# Patient Record
Sex: FEMALE | Race: BLACK OR AFRICAN AMERICAN | Employment: OTHER | URBAN - METROPOLITAN AREA
[De-identification: names, ages, dates, MRNs, and addresses within clinical notes are randomized per-mention and may not be internally consistent; named-entity substitution may affect disease eponyms.]

---

## 2020-07-26 ENCOUNTER — APPOINTMENT (OUTPATIENT)
Dept: CT IMAGING | Age: 70
DRG: 372 | End: 2020-07-26
Payer: MEDICARE

## 2020-07-26 ENCOUNTER — APPOINTMENT (OUTPATIENT)
Dept: GENERAL RADIOLOGY | Age: 70
DRG: 372 | End: 2020-07-26
Payer: MEDICARE

## 2020-07-26 ENCOUNTER — HOSPITAL ENCOUNTER (INPATIENT)
Age: 70
LOS: 6 days | Discharge: HOME HEALTH | DRG: 372 | End: 2020-08-01
Attending: EMERGENCY MEDICINE | Admitting: HOSPITALIST
Payer: MEDICARE

## 2020-07-26 PROBLEM — R19.7 DIARRHEA OF PRESUMED INFECTIOUS ORIGIN: Status: ACTIVE | Noted: 2020-07-26

## 2020-07-26 PROBLEM — F10.11 HISTORY OF ALCOHOL ABUSE: Status: ACTIVE | Noted: 2020-07-26

## 2020-07-26 PROBLEM — L89.319 PRESSURE INJURY OF SKIN OF RIGHT BUTTOCK: Status: ACTIVE | Noted: 2020-07-26

## 2020-07-26 PROBLEM — R19.7 ACUTE DIARRHEA: Status: ACTIVE | Noted: 2020-07-26

## 2020-07-26 PROBLEM — F03.918 DEMENTIA WITH BEHAVIORAL DISTURBANCE: Status: ACTIVE | Noted: 2020-07-26

## 2020-07-26 PROBLEM — S30.810A EXCORIATION OF BUTTOCK: Status: ACTIVE | Noted: 2020-07-26

## 2020-07-26 PROBLEM — K52.9 ACUTE COLITIS: Status: ACTIVE | Noted: 2020-07-26

## 2020-07-26 PROBLEM — E87.6 HYPOKALEMIA: Status: ACTIVE | Noted: 2020-07-26

## 2020-07-26 LAB
A/G RATIO: 0.7 (ref 1.1–2.2)
ALBUMIN SERPL-MCNC: 2.7 G/DL (ref 3.4–5)
ALP BLD-CCNC: 101 U/L (ref 40–129)
ALT SERPL-CCNC: 19 U/L (ref 10–40)
ANION GAP SERPL CALCULATED.3IONS-SCNC: 13 MMOL/L (ref 3–16)
AST SERPL-CCNC: 33 U/L (ref 15–37)
BANDED NEUTROPHILS RELATIVE PERCENT: 2 % (ref 0–7)
BASOPHILS ABSOLUTE: 0 K/UL (ref 0–0.2)
BASOPHILS RELATIVE PERCENT: 0 %
BILIRUB SERPL-MCNC: 0.3 MG/DL (ref 0–1)
BILIRUBIN URINE: ABNORMAL
BLOOD, URINE: NEGATIVE
BUN BLDV-MCNC: 12 MG/DL (ref 7–20)
CALCIUM SERPL-MCNC: 9.6 MG/DL (ref 8.3–10.6)
CHLORIDE BLD-SCNC: 106 MMOL/L (ref 99–110)
CLARITY: CLEAR
CO2: 19 MMOL/L (ref 21–32)
COLOR: ABNORMAL
CREAT SERPL-MCNC: 0.6 MG/DL (ref 0.6–1.2)
EOSINOPHILS ABSOLUTE: 0 K/UL (ref 0–0.6)
EOSINOPHILS RELATIVE PERCENT: 0 %
GFR AFRICAN AMERICAN: >60
GFR NON-AFRICAN AMERICAN: >60
GLOBULIN: 4.1 G/DL
GLUCOSE BLD-MCNC: 90 MG/DL (ref 70–99)
GLUCOSE URINE: NEGATIVE MG/DL
HCT VFR BLD CALC: 41.8 % (ref 36–48)
HEMOGLOBIN: 13.7 G/DL (ref 12–16)
KETONES, URINE: 15 MG/DL
LACTIC ACID, SEPSIS: 1.7 MMOL/L (ref 0.4–1.9)
LEUKOCYTE ESTERASE, URINE: NEGATIVE
LIPASE: 15 U/L (ref 13–60)
LYMPHOCYTES ABSOLUTE: 0.5 K/UL (ref 1–5.1)
LYMPHOCYTES RELATIVE PERCENT: 4 %
MAGNESIUM: 2.3 MG/DL (ref 1.8–2.4)
MCH RBC QN AUTO: 31.9 PG (ref 26–34)
MCHC RBC AUTO-ENTMCNC: 32.9 G/DL (ref 31–36)
MCV RBC AUTO: 96.9 FL (ref 80–100)
MICROSCOPIC EXAMINATION: ABNORMAL
MONOCYTES ABSOLUTE: 1.3 K/UL (ref 0–1.3)
MONOCYTES RELATIVE PERCENT: 11 %
NEUTROPHILS ABSOLUTE: 10.1 K/UL (ref 1.7–7.7)
NEUTROPHILS RELATIVE PERCENT: 83 %
NITRITE, URINE: NEGATIVE
PDW BLD-RTO: 12.7 % (ref 12.4–15.4)
PH UA: 6 (ref 5–8)
PLATELET # BLD: 313 K/UL (ref 135–450)
PLATELET SLIDE REVIEW: ADEQUATE
PMV BLD AUTO: 8.3 FL (ref 5–10.5)
POTASSIUM REFLEX MAGNESIUM: 3 MMOL/L (ref 3.5–5.1)
PROTEIN UA: NEGATIVE MG/DL
RBC # BLD: 4.31 M/UL (ref 4–5.2)
RBC # BLD: NORMAL 10*6/UL
SLIDE REVIEW: ABNORMAL
SODIUM BLD-SCNC: 138 MMOL/L (ref 136–145)
SPECIFIC GRAVITY UA: 1.02 (ref 1–1.03)
TOTAL PROTEIN: 6.8 G/DL (ref 6.4–8.2)
TOXIC GRANULATION: PRESENT
URINE REFLEX TO CULTURE: ABNORMAL
URINE TYPE: ABNORMAL
UROBILINOGEN, URINE: 1 E.U./DL
WBC # BLD: 11.9 K/UL (ref 4–11)

## 2020-07-26 PROCEDURE — 2580000003 HC RX 258: Performed by: NURSE PRACTITIONER

## 2020-07-26 PROCEDURE — 96365 THER/PROPH/DIAG IV INF INIT: CPT

## 2020-07-26 PROCEDURE — 85025 COMPLETE CBC W/AUTO DIFF WBC: CPT

## 2020-07-26 PROCEDURE — 6370000000 HC RX 637 (ALT 250 FOR IP): Performed by: HOSPITALIST

## 2020-07-26 PROCEDURE — 84443 ASSAY THYROID STIM HORMONE: CPT

## 2020-07-26 PROCEDURE — 83735 ASSAY OF MAGNESIUM: CPT

## 2020-07-26 PROCEDURE — 83605 ASSAY OF LACTIC ACID: CPT

## 2020-07-26 PROCEDURE — 81003 URINALYSIS AUTO W/O SCOPE: CPT

## 2020-07-26 PROCEDURE — 2500000003 HC RX 250 WO HCPCS: Performed by: NURSE PRACTITIONER

## 2020-07-26 PROCEDURE — 74177 CT ABD & PELVIS W/CONTRAST: CPT

## 2020-07-26 PROCEDURE — 87040 BLOOD CULTURE FOR BACTERIA: CPT

## 2020-07-26 PROCEDURE — 99285 EMERGENCY DEPT VISIT HI MDM: CPT

## 2020-07-26 PROCEDURE — 83690 ASSAY OF LIPASE: CPT

## 2020-07-26 PROCEDURE — 6370000000 HC RX 637 (ALT 250 FOR IP): Performed by: NURSE PRACTITIONER

## 2020-07-26 PROCEDURE — 80053 COMPREHEN METABOLIC PANEL: CPT

## 2020-07-26 PROCEDURE — 71045 X-RAY EXAM CHEST 1 VIEW: CPT

## 2020-07-26 PROCEDURE — U0003 INFECTIOUS AGENT DETECTION BY NUCLEIC ACID (DNA OR RNA); SEVERE ACUTE RESPIRATORY SYNDROME CORONAVIRUS 2 (SARS-COV-2) (CORONAVIRUS DISEASE [COVID-19]), AMPLIFIED PROBE TECHNIQUE, MAKING USE OF HIGH THROUGHPUT TECHNOLOGIES AS DESCRIBED BY CMS-2020-01-R: HCPCS

## 2020-07-26 PROCEDURE — C9113 INJ PANTOPRAZOLE SODIUM, VIA: HCPCS | Performed by: HOSPITALIST

## 2020-07-26 PROCEDURE — 1200000000 HC SEMI PRIVATE

## 2020-07-26 PROCEDURE — 6360000004 HC RX CONTRAST MEDICATION: Performed by: NURSE PRACTITIONER

## 2020-07-26 PROCEDURE — 6360000002 HC RX W HCPCS: Performed by: HOSPITALIST

## 2020-07-26 PROCEDURE — 6360000002 HC RX W HCPCS: Performed by: NURSE PRACTITIONER

## 2020-07-26 RX ORDER — MAGNESIUM SULFATE 1 G/100ML
1 INJECTION INTRAVENOUS PRN
Status: DISCONTINUED | OUTPATIENT
Start: 2020-07-26 | End: 2020-08-01 | Stop reason: HOSPADM

## 2020-07-26 RX ORDER — ESCITALOPRAM OXALATE 10 MG/1
10 TABLET ORAL DAILY
Status: DISCONTINUED | OUTPATIENT
Start: 2020-07-27 | End: 2020-07-26 | Stop reason: ALTCHOICE

## 2020-07-26 RX ORDER — NICOTINE 21 MG/24HR
1 PATCH, TRANSDERMAL 24 HOURS TRANSDERMAL EVERY 24 HOURS
Status: DISCONTINUED | OUTPATIENT
Start: 2020-07-26 | End: 2020-08-01 | Stop reason: HOSPADM

## 2020-07-26 RX ORDER — ONDANSETRON 2 MG/ML
4 INJECTION INTRAMUSCULAR; INTRAVENOUS EVERY 6 HOURS PRN
Status: DISCONTINUED | OUTPATIENT
Start: 2020-07-26 | End: 2020-08-01 | Stop reason: HOSPADM

## 2020-07-26 RX ORDER — SODIUM PHOSPHATE, DIBASIC AND SODIUM PHOSPHATE, MONOBASIC 7; 19 G/133ML; G/133ML
1 ENEMA RECTAL PRN
Status: DISCONTINUED | OUTPATIENT
Start: 2020-07-26 | End: 2020-07-27

## 2020-07-26 RX ORDER — ACETAMINOPHEN 325 MG/1
650 TABLET ORAL EVERY 6 HOURS PRN
Status: DISCONTINUED | OUTPATIENT
Start: 2020-07-26 | End: 2020-08-01 | Stop reason: HOSPADM

## 2020-07-26 RX ORDER — CIPROFLOXACIN 2 MG/ML
400 INJECTION, SOLUTION INTRAVENOUS ONCE
Status: COMPLETED | OUTPATIENT
Start: 2020-07-26 | End: 2020-07-26

## 2020-07-26 RX ORDER — LANOLIN ALCOHOL/MO/W.PET/CERES
1000 CREAM (GRAM) TOPICAL DAILY
Status: DISCONTINUED | OUTPATIENT
Start: 2020-07-27 | End: 2020-08-01 | Stop reason: HOSPADM

## 2020-07-26 RX ORDER — ACETAMINOPHEN 650 MG/1
650 SUPPOSITORY RECTAL EVERY 6 HOURS PRN
Status: DISCONTINUED | OUTPATIENT
Start: 2020-07-26 | End: 2020-08-01 | Stop reason: HOSPADM

## 2020-07-26 RX ORDER — SODIUM CHLORIDE 0.9 % (FLUSH) 0.9 %
10 SYRINGE (ML) INJECTION PRN
Status: DISCONTINUED | OUTPATIENT
Start: 2020-07-26 | End: 2020-08-01 | Stop reason: HOSPADM

## 2020-07-26 RX ORDER — THIAMINE MONONITRATE (VIT B1) 100 MG
100 TABLET ORAL DAILY
Status: DISCONTINUED | OUTPATIENT
Start: 2020-07-27 | End: 2020-08-01 | Stop reason: HOSPADM

## 2020-07-26 RX ORDER — DONEPEZIL HYDROCHLORIDE 5 MG/1
5 TABLET, FILM COATED ORAL NIGHTLY
Status: DISCONTINUED | OUTPATIENT
Start: 2020-07-26 | End: 2020-08-01 | Stop reason: HOSPADM

## 2020-07-26 RX ORDER — POTASSIUM CHLORIDE 20 MEQ/1
40 TABLET, EXTENDED RELEASE ORAL PRN
Status: DISCONTINUED | OUTPATIENT
Start: 2020-07-26 | End: 2020-08-01 | Stop reason: HOSPADM

## 2020-07-26 RX ORDER — 0.9 % SODIUM CHLORIDE 0.9 %
1000 INTRAVENOUS SOLUTION INTRAVENOUS ONCE
Status: COMPLETED | OUTPATIENT
Start: 2020-07-26 | End: 2020-07-26

## 2020-07-26 RX ORDER — AMLODIPINE BESYLATE 5 MG/1
5 TABLET ORAL DAILY
Status: DISCONTINUED | OUTPATIENT
Start: 2020-07-27 | End: 2020-08-01 | Stop reason: HOSPADM

## 2020-07-26 RX ORDER — ONDANSETRON 4 MG/1
4 TABLET, ORALLY DISINTEGRATING ORAL EVERY 8 HOURS PRN
Status: DISCONTINUED | OUTPATIENT
Start: 2020-07-26 | End: 2020-08-01 | Stop reason: HOSPADM

## 2020-07-26 RX ORDER — THIAMINE MONONITRATE (VIT B1) 100 MG
100 TABLET ORAL DAILY
COMMUNITY

## 2020-07-26 RX ORDER — IPRATROPIUM BROMIDE AND ALBUTEROL SULFATE 2.5; .5 MG/3ML; MG/3ML
1 SOLUTION RESPIRATORY (INHALATION) EVERY 4 HOURS PRN
Status: DISCONTINUED | OUTPATIENT
Start: 2020-07-26 | End: 2020-08-01 | Stop reason: HOSPADM

## 2020-07-26 RX ORDER — TRAMADOL HYDROCHLORIDE 50 MG/1
50 TABLET ORAL ONCE
Status: COMPLETED | OUTPATIENT
Start: 2020-07-26 | End: 2020-07-26

## 2020-07-26 RX ORDER — CIPROFLOXACIN 2 MG/ML
400 INJECTION, SOLUTION INTRAVENOUS EVERY 12 HOURS
Status: DISCONTINUED | OUTPATIENT
Start: 2020-07-27 | End: 2020-08-01

## 2020-07-26 RX ORDER — PANTOPRAZOLE SODIUM 40 MG/10ML
40 INJECTION, POWDER, LYOPHILIZED, FOR SOLUTION INTRAVENOUS DAILY
Status: DISCONTINUED | OUTPATIENT
Start: 2020-07-26 | End: 2020-08-01 | Stop reason: HOSPADM

## 2020-07-26 RX ORDER — FOLIC ACID 1 MG/1
1 TABLET ORAL DAILY
COMMUNITY

## 2020-07-26 RX ORDER — AMLODIPINE BESYLATE 5 MG/1
5 TABLET ORAL DAILY
COMMUNITY

## 2020-07-26 RX ORDER — OYSTER SHELL CALCIUM WITH VITAMIN D 500; 200 MG/1; [IU]/1
1 TABLET, FILM COATED ORAL 2 TIMES DAILY WITH MEALS
Status: DISCONTINUED | OUTPATIENT
Start: 2020-07-27 | End: 2020-07-26 | Stop reason: RX

## 2020-07-26 RX ORDER — POTASSIUM CHLORIDE 7.45 MG/ML
10 INJECTION INTRAVENOUS PRN
Status: DISCONTINUED | OUTPATIENT
Start: 2020-07-26 | End: 2020-08-01 | Stop reason: HOSPADM

## 2020-07-26 RX ORDER — DONEPEZIL HYDROCHLORIDE 5 MG/1
5 TABLET, FILM COATED ORAL NIGHTLY
COMMUNITY

## 2020-07-26 RX ORDER — ESCITALOPRAM OXALATE 10 MG/1
10 TABLET ORAL DAILY
COMMUNITY

## 2020-07-26 RX ORDER — FOLIC ACID 1 MG/1
1 TABLET ORAL DAILY
Status: DISCONTINUED | OUTPATIENT
Start: 2020-07-27 | End: 2020-08-01 | Stop reason: HOSPADM

## 2020-07-26 RX ORDER — OYSTER SHELL CALCIUM WITH VITAMIN D 500; 200 MG/1; [IU]/1
1 TABLET, FILM COATED ORAL 2 TIMES DAILY
COMMUNITY

## 2020-07-26 RX ORDER — ANASTROZOLE 1 MG/1
1 TABLET ORAL DAILY
COMMUNITY

## 2020-07-26 RX ADMIN — TRAMADOL HYDROCHLORIDE 50 MG: 50 TABLET, FILM COATED ORAL at 16:05

## 2020-07-26 RX ADMIN — POTASSIUM CHLORIDE 10 MEQ: 7.46 INJECTION, SOLUTION INTRAVENOUS at 21:11

## 2020-07-26 RX ADMIN — PANTOPRAZOLE SODIUM 40 MG: 40 INJECTION, POWDER, FOR SOLUTION INTRAVENOUS at 21:10

## 2020-07-26 RX ADMIN — POTASSIUM CHLORIDE 10 MEQ: 7.46 INJECTION, SOLUTION INTRAVENOUS at 23:31

## 2020-07-26 RX ADMIN — METRONIDAZOLE 500 MG: 500 INJECTION, SOLUTION INTRAVENOUS at 18:09

## 2020-07-26 RX ADMIN — SODIUM CHLORIDE 1000 ML: 9 INJECTION, SOLUTION INTRAVENOUS at 13:43

## 2020-07-26 RX ADMIN — IOPAMIDOL 75 ML: 755 INJECTION, SOLUTION INTRAVENOUS at 14:59

## 2020-07-26 RX ADMIN — POTASSIUM CHLORIDE 10 MEQ: 7.46 INJECTION, SOLUTION INTRAVENOUS at 22:25

## 2020-07-26 RX ADMIN — CIPROFLOXACIN 400 MG: 2 INJECTION, SOLUTION INTRAVENOUS at 16:10

## 2020-07-26 ASSESSMENT — PAIN SCALES - GENERAL
PAINLEVEL_OUTOF10: 0
PAINLEVEL_OUTOF10: 3
PAINLEVEL_OUTOF10: 5

## 2020-07-26 ASSESSMENT — ENCOUNTER SYMPTOMS: DIARRHEA: 1

## 2020-07-26 NOTE — ED PROVIDER NOTES
[facility]        Pt Name: Maximo Brody  MRN: [id number]  [place] 1950  Date of evaluation: 7/26/2020  Provider: RUI Chamorro - GRISELDA  PCP: Dain Guadalupe MD    This patient was not seen and evaluated by the attending physician Perri Badillo MD.    [address] [place] [address]       Chief Complaint   Patient presents with    Diarrhea     pt brought in by private ems from [facility], c/o loose stool x 4 days        HISTORY OF PRESENT ILLNESS   (Location/Symptom, Timing/Onset,Context/Setting, Quality, Duration, Modifying Factors, Severity)  Note limiting factors. Maximo Brody is a 81 y.o. female who presents emergency department with concern for diarrhea. According to the facility where she lives she is had 4 days of loose stools. She has pretty bad ulceration to her buttocks as a result. She is currently living in a dementia unit and is typically alert to person but not place or time. She is verbally aggressive on arrival and there are no care providers or family available for further history. Nursing Notes triage note reviewed and agreed with or any disagreements were addressed  in the HPI. REVIEW OF SYSTEMS    (2-9 systems for level 4, 10 or more for level 5)     Review of Systems   Gastrointestinal: Positive for diarrhea. Skin: Positive for wound. Positives and Pertinent negatives as per HPI. Except as noted above in the ROS, all other systems were reviewed and negative.        PAST MEDICAL HISTORY     Past Medical History:   Diagnosis Date    Cancer [facility])     cancer    Hypertension     Pneumonia     Sickle cell trait ([facility] [address]          SURGICAL HISTORY       Past Surgical History:   Procedure Laterality Date    HEMORRHOID SURGERY      TUBAL LIGATION           CURRENT MEDICATIONS       Previous Medications    AMLODIPINE (NORVASC) 5 MG TABLET    Take 5 mg by mouth daily    ANASTROZOLE (ARIMIDEX) 1 MG organizations: Not on file     Relationship status: Not on file    Intimate partner violence     Fear of current or ex partner: Not on file     Emotionally abused: Not on file     Physically abused: Not on file     Forced sexual activity: Not on file   Other Topics Concern    Not on file   Social History Narrative    Not on file       SCREENINGS             PHYSICAL EXAM  (up to 7 for level 4, 8 or more for level 5)     ED Triage Vitals [07/26/20 1131]   BP Temp Temp Source Pulse Resp SpO2 Height Weight   103/68 98 °F (36.7 °C) Oral 70 14 100 % 5' 6\" (1.676 m) 135 lb (61.2 kg)       Physical Exam  Vitals signs and nursing note reviewed. Exam conducted with a chaperone present. Constitutional:       Appearance: She is well-developed and underweight. She is not diaphoretic. HENT:      Head: Normocephalic and atraumatic. Eyes:      General: No scleral icterus. Right eye: No discharge. Left eye: No discharge. Neck:      Musculoskeletal: Normal range of motion and neck supple. Cardiovascular:      Rate and Rhythm: Normal rate and regular rhythm. Heart sounds: Normal heart sounds. No murmur. No friction rub. No gallop. Pulmonary:      Effort: Pulmonary effort is normal. No respiratory distress. Breath sounds: Normal breath sounds. No stridor. No wheezing or rales. Chest:      Chest wall: No tenderness. Abdominal:      General: Bowel sounds are normal. There is no distension. Palpations: Abdomen is soft. There is no mass. Tenderness: There is no abdominal tenderness. There is no guarding or rebound. Genitourinary:     Rectum: External hemorrhoid present. No mass, tenderness, anal fissure or internal hemorrhoid. Normal anal tone. Comments: Satish Ellis RN present during rectal exam.  Patient tolerated very poorly secondary to pain and dementia. There is stool in the rectal vault but no obvious stool ball. Musculoskeletal: Normal range of motion. Performed at:  [facility] [address] [facility], [address]   Phone [phone]   LACTATE, SEPSIS   COVID-19       All other labs werewithin normal range or not returned as of this dictation. EKG: All EKG's are interpreted by the Emergency Department Physician who either signs or Co-signs this chart in the absence of acardiologist.  Please see their note for interpretation of EKG. RADIOLOGY:   Interpretation per the Radiologist below, if available at the time of this note:    CT ABDOMEN PELVIS W IV CONTRAST Additional Contrast? None   Preliminary Result   1. Colonic wall thickening with perienteric inflammatory changes in the   distal sigmoid colon and rectum concerning for stercoral colitis. Mild   retained stool. 2. No other acute findings within the abdomen or pelvis. 3. Degenerated calcified uterine fibroids. 4. Moderate distention of the urinary bladder. No results found. PROCEDURES   Unless otherwise noted below, none     Procedures    CRITICAL CARE TIME     There was a high probability of life-threatening clinical deterioration in the patient's condition requiring my urgent intervention. The total critical care time spent while evaluating and treating this patient was at least 0 minutes. This excludes time spent doing separately billable procedures. This includes time at the bedside, data interpretation, medication management, obtaining critical history from collateral sources if the patient is unable to provide it directly, and physician consultation. Specifics of interventions taken and potentially life-threatening diagnostic considerations are listed in the medical decision making.       CONSULTS:  IP CONSULT TO HOSPITALIST  IP CONSULT TO GI      EMERGENCY DEPARTMENT COURSE and DIFFERENTIAL DIAGNOSIS/MDM:   Vitals:    Vitals:    07/26/20 1131 07/26/20 1145 07/26/20 1215 07/26/20 1445   BP: 103/68 115/64 113/66 123/65   Pulse: 70 69 70

## 2020-07-26 NOTE — ED PROVIDER NOTES
[facility] Emergency Department      Pt Name: Tacho Jacobs  MRN: [id number]  [place] 1950  Date of evaluation: 7/26/2020  Provider: Feliciano Dela Cruz MD  I independently performed a history and physical on Tacho Jacobs. All diagnostic, treatment, and disposition decisions were made by myself in conjunction with the advanced practice provider. HPI: Tacho Jacobs presented with   Chief Complaint   Patient presents with    Diarrhea     pt brought in by private ems from [facility], c/o loose stool x 4 days      Tacho Jacobs has a past medical history of Cancer ([facility] [address] Hypertension, Pneumonia, and Sickle cell trait ([facility] [address] She has a past surgical history that includes Hemorrhoid surgery and Tubal ligation. No current facility-administered medications on file prior to encounter. Current Outpatient Medications on File Prior to Encounter   Medication Sig Dispense Refill    hydrochlorothiazide (HYDRODIURIL) 25 MG tablet Take 25 mg by mouth daily      ibuprofen (ADVIL;MOTRIN) 800 MG tablet Take 800 mg by mouth daily      HYDROcodone-acetaminophen (NORCO) 5-325 MG per tablet Take 1 tablet by mouth every 6 hours as needed for Pain (Takes it once or twice a day)       PHYSICAL EXAM  Vitals: /68   Pulse 70   Temp 98 °F (36.7 °C) (Oral)   Resp 14   Ht 5' 6\" (1.676 m)   Wt 135 lb (61.2 kg)   SpO2 100%   BMI 21.79 kg/m²   Constitutional:  81 y.o. female alert but very poor historian, hx dementia  HENT:  Atraumatic, oral mucosa moist  Neck:  No visible JVD, supple  Chest/Lungs:  Respiratory effort normal, clear, regular  Abdomen:  Non-distended, soft, NT  Back:  No gross deformity  Extremities:  Normal tone and perfusion    Medical Decision Making and Plan: Briefly, this is an 81 y. o.female who presented with concern for diarrhea, also skin ulceration. Findings of colitis present. Plan is to admit for further care.      For further details of [address] Emergency Department encounter, CHEST PORTABLE   Final Result   Possible acute infiltrate such as pneumonia medial and central lower left   lung. Pulmonary sequela typical of that seen with smoking, including COPD. Calcific atherosclerotic disease aorta. CT ABDOMEN PELVIS W IV CONTRAST Additional Contrast? None   Preliminary Result   1. Colonic wall thickening with perienteric inflammatory changes in the   distal sigmoid colon and rectum concerning for stercoral colitis. Mild   retained stool. 2. No other acute findings within the abdomen or pelvis. 3. Degenerated calcified uterine fibroids. 4. Moderate distention of the urinary bladder.            Medications administered:  Medications   vitamin B-12 (CYANOCOBALAMIN) tablet 1,000 mcg (has no administration in time range)   donepezil (ARICEPT) tablet 5 mg (5 mg Oral Not Given 9/41/66 1944)   folic acid (FOLVITE) tablet 1 mg (has no administration in time range)   vitamin B-1 (THIAMINE) tablet 100 mg (has no administration in time range)   amLODIPine (NORVASC) tablet 5 mg (has no administration in time range)   sodium chloride flush 0.9 % injection 10 mL (has no administration in time range)   potassium chloride (KLOR-CON M) extended release tablet 40 mEq ( Oral See Alternative 7/26/20 2331)     Or   potassium bicarb-citric acid (EFFER-K) effervescent tablet 40 mEq ( Oral See Alternative 7/26/20 2331)     Or   potassium chloride 10 mEq/100 mL IVPB (Peripheral Line) (10 mEq Intravenous New Bag 7/26/20 2331)   magnesium sulfate 1 g in dextrose 5% 100 mL IVPB (has no administration in time range)   acetaminophen (TYLENOL) tablet 650 mg (has no administration in time range)     Or   acetaminophen (TYLENOL) suppository 650 mg (has no administration in time range)   ipratropium-albuterol (DUONEB) nebulizer solution 1 ampule (has no administration in time range)   ondansetron (ZOFRAN-ODT) disintegrating tablet 4 mg (has no administration in time range)     Or   ondansetron (ZOFRAN) injection 4 mg (has no administration in time range)   pantoprazole (PROTONIX) injection 40 mg (40 mg Intravenous Given 7/26/20 2110)   nicotine (NICODERM CQ) 21 MG/24HR 1 patch (1 patch Transdermal Patch Applied 7/26/20 2111)   enoxaparin (LOVENOX) injection 40 mg (has no administration in time range)   metronidazole (FLAGYL) 500 mg in NaCl 100 mL IVPB premix (has no administration in time range)   ciprofloxacin (CIPRO) IVPB 400 mg (has no administration in time range)   fleet rectal enema 1 enema (has no administration in time range)   polyethylene glycol (GLYCOLAX) packet 17 g (has no administration in time range)   mineral oil enema 1 enema (has no administration in time range)   0.9 % sodium chloride bolus (0 mLs Intravenous Stopped 7/26/20 1507)   iopamidol (ISOVUE-370) 76 % injection 75 mL (75 mLs Intravenous Given 7/26/20 1459)   traMADol (ULTRAM) tablet 50 mg (50 mg Oral Given 7/26/20 1605)   ciprofloxacin (CIPRO) IVPB 400 mg (0 mg Intravenous Stopped 7/26/20 1814)   metronidazole (FLAGYL) 500 mg in NaCl 100 mL IVPB premix (0 mg Intravenous Stopped 7/26/20 1910)     FINAL IMPRESSION:    1.  Colitis       DISPOSITION Admitted 07/26/2020 05:22:25 PM       Nena Cantor MD  07/27/20 0030

## 2020-07-26 NOTE — ED NOTES
Changed pt, pt continues with scant loose stool. Provided nicki-care. Pt daughter to room to see pt. Placed Mepilex to coccyx./  Pt is on a continuous pulse oximetry and telemetry monitoring. Pt continued on cycling blood pressure. Fall risk precautions in place, call light in reach, bed side table within reach, bed alarm on, will continue to monitor.          Mia Lamb RN  07/26/20 0155

## 2020-07-26 NOTE — ED NOTES
Pt seen on tele on 5T HR 62, Unit secretary.    Pt transport by hospital transport team.      Odilia Cage RN  07/26/20 0102

## 2020-07-26 NOTE — H&P
HOSPITALISTS HISTORY AND PHYSICAL    7/26/2020 5:55 PM    Patient Information:  Hao Shields is a 81 y.o. female [id number]  PCP:  Yany Ma MD (Tel: [phone] )    Chief complaint:    Chief Complaint   Patient presents with    Diarrhea     pt brought in by private ems from [facility], c/o loose stool x 4 days         History of Present Illness:  Terry Spencer is a 81 y.o. female who presented to the ED from [facility] memory unit to be evaluated for 4-day history of diarrhea. The patient has a history of dementia with behavioral disturbance, and she has been verbally aggressive since arrival to the hospital.  As a result of this, the ED history is limited. Labs were obtained in addition to CT scan of the abdomen pelvis. The latter study showed findings concerning for acute colitis possibly stercoral in nature. During attempt to manually disimpact the patient in the ED, it was noted that she had a small decubitus excoriation on her right buttock. She also did not tolerate attempts to remove the formed stool bolus in her rectum. Hospitalist consulted to admit this patient and provide care for a forementioned comorbidities    History obtained from patient and epic chart  Old medical records show that the patient has a history of alcohol abuse. Her daughter Priscilla Sapp has received guardianship of the patient per telephone encounter with  documented on July 13, 2020. She also has a history of invasive lobar carcinoma of the breast for which she is on an aromatase inhibitor. REVIEW OF SYSTEMS: Results based on objective findings due to patient's refusal to answer questions  Constitutional: Negative for fever,chills or night sweats  ENT: Negative for rhinorrhea, epistaxis, hoarseness, sore throat.   Respiratory: Negative for shortness of breath,wheezing  Cardiovascular: Negative for chest pain, palpitations   Gastrointestinal: Positive foul-smelling diarrhea with incontinence  Genitourinary: Negative for polyuria, dysuria   Hematologic/Lymphatic: Negative for bleeding tendency, easy bruising  Musculoskeletal: Positive for myalgias and arthralgias  Neurologic: Negative for confusion,dysarthria. Skin: Right buttock pressure ulcer  Psychiatric: Dementia with agitation  Endocrine: Negative for polydipsia,polyuria,heat /cold intolerance. Past Medical History:   has a past medical history of Cancer ([facility] [address] Hypertension, Pneumonia, and Sickle cell trait ([facility] [address] Past Surgical History:   has a past surgical history that includes Hemorrhoid surgery and Tubal ligation. Medications:  No current facility-administered medications on file prior to encounter. Current Outpatient Medications on File Prior to Encounter   Medication Sig Dispense Refill    amLODIPine (NORVASC) 5 MG tablet Take 5 mg by mouth daily      anastrozole (ARIMIDEX) 1 MG tablet Take 1 mg by mouth daily      calcium-vitamin D (OSCAL-500) 500-200 MG-UNIT per tablet Take 1 tablet by mouth 2 times daily      cyanocobalamin 1000 MCG tablet Take 1,000 mcg by mouth daily      donepezil (ARICEPT) 5 MG tablet Take 5 mg by mouth nightly      escitalopram (LEXAPRO) 10 MG tablet Take 10 mg by mouth daily      folic acid (FOLVITE) 1 MG tablet Take 1 mg by mouth daily      vitamin B-1 (THIAMINE) 100 MG tablet Take 100 mg by mouth daily      hydrochlorothiazide (HYDRODIURIL) 25 MG tablet Take 25 mg by mouth daily      ibuprofen (ADVIL;MOTRIN) 800 MG tablet Take 800 mg by mouth daily      HYDROcodone-acetaminophen (NORCO) 5-325 MG per tablet Take 1 tablet by mouth every 6 hours as needed for Pain (Takes it once or twice a day)         Allergies:  No Known Allergies     Social History:  Patient Lives at [facility] memory loss [facility]. reports that she has been smoking cigarettes.  She has been smoking about 1.00 pack per day. She does not have any smokeless tobacco history on file. She reports current alcohol use. She reports that she does not use drugs. Family History:  family history is not on file. Physical Exam:  /65   Pulse 70   Temp 98 °F (36.7 °C) (Oral)   Resp 14   Ht 5' 6\" (1.676 m)   Wt 135 lb (61.2 kg)   SpO2 96%   BMI 21.79 kg/m²     General appearance: Agitated female who appears distressed  Eyes: Sclera clear, pupils equal  ENT: Moist mucus membranes, no thrush. Trachea midline. Cardiovascular: Regular rhythm, normal S1, S2. No murmur, gallop, rub. No edema in lower extremities  Respiratory: Decreased breath sounds with poor inspiratory effort  Gastrointestinal: Abdomen tender with hypoactive bowel sounds; positive guarding  Musculoskeletal: No cyanosis in digits, neck supple  Neurology: Cranial nerves grossly intact. Alert with very limited short-term memory  Psychiatry: Agitated and verbally aggressive with staff  Skin: Right buttock with coin sized excoriation/stage II decubitus  Brisk capillary refill, peripheral pulses palpable   Labs:  CBC:   Lab Results   Component Value Date    WBC 11.9 07/26/2020    RBC 4.31 07/26/2020    HGB 13.7 07/26/2020    HCT 41.8 07/26/2020    MCV 96.9 07/26/2020    MCH 31.9 07/26/2020    MCHC 32.9 07/26/2020    RDW 12.7 07/26/2020     07/26/2020    MPV 8.3 07/26/2020     BMP:    Lab Results   Component Value Date     07/26/2020    K 3.0 07/26/2020     07/26/2020    CO2 19 07/26/2020    BUN 12 07/26/2020    CREATININE 0.6 07/26/2020    CALCIUM 9.6 07/26/2020    GFRAA >60 07/26/2020    LABGLOM >60 07/26/2020    GLUCOSE 90 07/26/2020     CT ABDOMEN PELVIS W IV CONTRAST Additional Contrast? None   Preliminary Result   1. Colonic wall thickening with perienteric inflammatory changes in the   distal sigmoid colon and rectum concerning for stercoral colitis. Mild   retained stool. 2. No other acute findings within the abdomen or pelvis. 3. Degenerated calcified uterine fibroids. 4. Moderate distention of the urinary bladder. CXR and EKG ordered by hospitalist team; results not yet available    Discussed case  with ED provider  Problem List  Principal Problem:    Acute colitis  Active Problems:    Hypokalemia    Excoriation of buttock    Acute diarrhea    Dementia with behavioral disturbance ([address]  Resolved Problems:    * No resolved hospital problems. *        Assessment/Plan:     1. Acute colitis (infectious versus stercoral)  -Patient admitted to remote telemetry unit for close observation  - Blood culture collected and patient initiated on IV Cipro and Flagyl  - Initiate mineral oil enemas as tolerated to remove stool burden   -GI consultation placed for possible manual disimpaction if other efforts are unsuccessful  -Clear liquid diet today and n.p.o. after midnight  - Stool sent for C. difficile toxin as well as fecal leukocytes and culture    2. Incontinence with right buttock decubitus  -Ramires placed for this as well as urinary retention  - Wound care consulted    3. Dementia with behavioral disturbance/history of alcoholism  - Alcohol intake unclear at this time therefore CIWA protocol in place  - Fall risk and seizure precautions taken  - Thiamine folate and MVI initiated  -PRN Ativan per CIWA score  -Nutrition consult secondary to hypoalbuminemia; prealbumin pending    4. COPD with tobacco abuse  - Nicotine replacement patch ordered  -DuoNebs as needed  -Continuous pulse oximetry with PRN supplemental O2      DVT prophylaxis-Lovenox 40 mg subcu daily  Code status-full code  Diet- clear liquid diet and n.p.o. at midnight  IV access-peripheral established in ED  Ramires Catheter-placed in ED due to incontinence and decubitus    Admit as inpatient. I anticipate hospitalization spanning more than two midnights for investigation and treatment of the above medically necessary diagnoses.     Please note that some part of this chart was generated using Dragon dictation software. Although every effort was made to ensure the accuracy of this automated transcription, some errors in transcription may have occurred inadvertently. If you may need any clarification, please do not hesitate to contact me through [facility].        Chitra Acosta MD    7/26/2020 5:55 PM

## 2020-07-26 NOTE — ED NOTES
Pt incontinent of urine, pt provided per Jorge A hoang CNP at bedside at assist and perform rectal exam. Pt cooperative. Ok per [place] CNP for pt to eat.       [facility]  07/26/20 0014

## 2020-07-26 NOTE — ED NOTES
Upon arrival pt is cussing at staff. Pt is combative with care. Pt is a/o to self only. Pt noted with large excoriation around her anus, bright red and open. Pt is straight cathed for urine. Pt incontinent of stool,  Provided nicki care, place zinc cream to buttock. Pt complaints of pain to buttock site, Pt cussing at staff with care. Pt refusing to keep on bp on and takes off cardiac leads. Call light in place and will continue to monitor.       Negra Conley RN  07/26/20 1676

## 2020-07-26 NOTE — ED NOTES
Bed: 06  Expected date:   Expected time:   Means of arrival:   Comments:  [facility] transport     Marian Ness RN  07/26/20 5794

## 2020-07-26 NOTE — PROGRESS NOTES
Pt admitted to room 5572, VSS, afebrile, pt currently denies pain but states her nicki-area is uncomfortable. Pt is alert but confused, is calm and cooperative. POC discussed with pt and dtr Willie Sender who had some questions/concerns which were addressed at this time. Fall px in placed, bed alarm engaged, call light within reach, will continue to monitor.

## 2020-07-27 LAB
A/G RATIO: 0.8 (ref 1.1–2.2)
ALBUMIN SERPL-MCNC: 2.5 G/DL (ref 3.4–5)
ALP BLD-CCNC: 91 U/L (ref 40–129)
ALT SERPL-CCNC: 14 U/L (ref 10–40)
ANION GAP SERPL CALCULATED.3IONS-SCNC: 11 MMOL/L (ref 3–16)
AST SERPL-CCNC: 25 U/L (ref 15–37)
BASOPHILS ABSOLUTE: 0.1 K/UL (ref 0–0.2)
BASOPHILS RELATIVE PERCENT: 0.9 %
BILIRUB SERPL-MCNC: <0.2 MG/DL (ref 0–1)
BUN BLDV-MCNC: 9 MG/DL (ref 7–20)
CALCIUM SERPL-MCNC: 8.9 MG/DL (ref 8.3–10.6)
CHLORIDE BLD-SCNC: 109 MMOL/L (ref 99–110)
CO2: 20 MMOL/L (ref 21–32)
CREAT SERPL-MCNC: 0.5 MG/DL (ref 0.6–1.2)
EOSINOPHILS ABSOLUTE: 0 K/UL (ref 0–0.6)
EOSINOPHILS RELATIVE PERCENT: 0.5 %
GFR AFRICAN AMERICAN: >60
GFR NON-AFRICAN AMERICAN: >60
GLOBULIN: 3.2 G/DL
GLUCOSE BLD-MCNC: 83 MG/DL (ref 70–99)
HCT VFR BLD CALC: 35.2 % (ref 36–48)
HEMOGLOBIN: 11.8 G/DL (ref 12–16)
LACTIC ACID: 0.8 MMOL/L (ref 0.4–2)
LYMPHOCYTES ABSOLUTE: 1.1 K/UL (ref 1–5.1)
LYMPHOCYTES RELATIVE PERCENT: 13 %
MCH RBC QN AUTO: 32.7 PG (ref 26–34)
MCHC RBC AUTO-ENTMCNC: 33.5 G/DL (ref 31–36)
MCV RBC AUTO: 97.6 FL (ref 80–100)
MONOCYTES ABSOLUTE: 1.4 K/UL (ref 0–1.3)
MONOCYTES RELATIVE PERCENT: 16 %
NEUTROPHILS ABSOLUTE: 6.1 K/UL (ref 1.7–7.7)
NEUTROPHILS RELATIVE PERCENT: 69.6 %
PDW BLD-RTO: 12.7 % (ref 12.4–15.4)
PLATELET # BLD: 291 K/UL (ref 135–450)
PMV BLD AUTO: 8.3 FL (ref 5–10.5)
POTASSIUM REFLEX MAGNESIUM: 3.6 MMOL/L (ref 3.5–5.1)
RBC # BLD: 3.61 M/UL (ref 4–5.2)
SARS-COV-2, PCR: NOT DETECTED
SODIUM BLD-SCNC: 140 MMOL/L (ref 136–145)
TOTAL PROTEIN: 5.7 G/DL (ref 6.4–8.2)
TSH REFLEX: 0.93 UIU/ML (ref 0.27–4.2)
WBC # BLD: 8.7 K/UL (ref 4–11)

## 2020-07-27 PROCEDURE — 6370000000 HC RX 637 (ALT 250 FOR IP): Performed by: HOSPITALIST

## 2020-07-27 PROCEDURE — 85025 COMPLETE CBC W/AUTO DIFF WBC: CPT

## 2020-07-27 PROCEDURE — 51702 INSERT TEMP BLADDER CATH: CPT

## 2020-07-27 PROCEDURE — 80053 COMPREHEN METABOLIC PANEL: CPT

## 2020-07-27 PROCEDURE — 6360000002 HC RX W HCPCS: Performed by: HOSPITALIST

## 2020-07-27 PROCEDURE — 2580000003 HC RX 258: Performed by: INTERNAL MEDICINE

## 2020-07-27 PROCEDURE — 2580000003 HC RX 258: Performed by: HOSPITALIST

## 2020-07-27 PROCEDURE — 83605 ASSAY OF LACTIC ACID: CPT

## 2020-07-27 PROCEDURE — 94761 N-INVAS EAR/PLS OXIMETRY MLT: CPT

## 2020-07-27 PROCEDURE — C9113 INJ PANTOPRAZOLE SODIUM, VIA: HCPCS | Performed by: HOSPITALIST

## 2020-07-27 PROCEDURE — 51798 US URINE CAPACITY MEASURE: CPT

## 2020-07-27 PROCEDURE — 36415 COLL VENOUS BLD VENIPUNCTURE: CPT

## 2020-07-27 PROCEDURE — 2500000003 HC RX 250 WO HCPCS: Performed by: HOSPITALIST

## 2020-07-27 PROCEDURE — 1200000000 HC SEMI PRIVATE

## 2020-07-27 RX ORDER — POLYETHYLENE GLYCOL 3350 17 G/17G
17 POWDER, FOR SOLUTION ORAL DAILY
Status: DISCONTINUED | OUTPATIENT
Start: 2020-07-27 | End: 2020-08-01 | Stop reason: HOSPADM

## 2020-07-27 RX ORDER — MINERAL OIL 100 G/100G
1 OIL RECTAL PRN
Status: DISCONTINUED | OUTPATIENT
Start: 2020-07-27 | End: 2020-07-27

## 2020-07-27 RX ORDER — LORAZEPAM 2 MG/ML
1 INJECTION INTRAMUSCULAR ONCE
Status: COMPLETED | OUTPATIENT
Start: 2020-07-27 | End: 2020-07-27

## 2020-07-27 RX ORDER — SODIUM CHLORIDE 9 MG/ML
INJECTION, SOLUTION INTRAVENOUS CONTINUOUS
Status: DISCONTINUED | OUTPATIENT
Start: 2020-07-27 | End: 2020-07-28

## 2020-07-27 RX ADMIN — CIPROFLOXACIN 400 MG: 2 INJECTION, SOLUTION INTRAVENOUS at 05:47

## 2020-07-27 RX ADMIN — Medication 10 ML: at 09:44

## 2020-07-27 RX ADMIN — POLYETHYLENE GLYCOL 3350 17 G: 17 POWDER, FOR SOLUTION ORAL at 00:57

## 2020-07-27 RX ADMIN — ENOXAPARIN SODIUM 40 MG: 40 INJECTION SUBCUTANEOUS at 09:44

## 2020-07-27 RX ADMIN — CIPROFLOXACIN 400 MG: 2 INJECTION, SOLUTION INTRAVENOUS at 18:59

## 2020-07-27 RX ADMIN — POTASSIUM CHLORIDE 10 MEQ: 7.46 INJECTION, SOLUTION INTRAVENOUS at 03:31

## 2020-07-27 RX ADMIN — SODIUM CHLORIDE: 9 INJECTION, SOLUTION INTRAVENOUS at 12:18

## 2020-07-27 RX ADMIN — METRONIDAZOLE 500 MG: 500 INJECTION, SOLUTION INTRAVENOUS at 04:40

## 2020-07-27 RX ADMIN — POTASSIUM CHLORIDE 10 MEQ: 7.46 INJECTION, SOLUTION INTRAVENOUS at 00:58

## 2020-07-27 RX ADMIN — ACETAMINOPHEN 650 MG: 325 TABLET, FILM COATED ORAL at 18:59

## 2020-07-27 RX ADMIN — METRONIDAZOLE 500 MG: 500 INJECTION, SOLUTION INTRAVENOUS at 21:43

## 2020-07-27 RX ADMIN — METRONIDAZOLE 500 MG: 500 INJECTION, SOLUTION INTRAVENOUS at 12:19

## 2020-07-27 RX ADMIN — LORAZEPAM 1 MG: 2 INJECTION INTRAMUSCULAR; INTRAVENOUS at 03:04

## 2020-07-27 RX ADMIN — PANTOPRAZOLE SODIUM 40 MG: 40 INJECTION, POWDER, FOR SOLUTION INTRAVENOUS at 09:44

## 2020-07-27 RX ADMIN — DONEPEZIL HYDROCHLORIDE 5 MG: 5 TABLET, FILM COATED ORAL at 21:44

## 2020-07-27 RX ADMIN — POTASSIUM CHLORIDE 10 MEQ: 7.46 INJECTION, SOLUTION INTRAVENOUS at 02:02

## 2020-07-27 ASSESSMENT — PAIN SCALES - PAIN ASSESSMENT IN ADVANCED DEMENTIA (PAINAD)
CONSOLABILITY: 0
NEGVOCALIZATION: 0
FACIALEXPRESSION: 0
TOTALSCORE: 0
CONSOLABILITY: 0
NEGVOCALIZATION: 0
CONSOLABILITY: 0
BODYLANGUAGE: 0
NEGVOCALIZATION: 0
BREATHING: 0
FACIALEXPRESSION: 0
TOTALSCORE: 0
NEGVOCALIZATION: 0
NEGVOCALIZATION: 0
TOTALSCORE: 0
BODYLANGUAGE: 0
BODYLANGUAGE: 0
FACIALEXPRESSION: 0
BREATHING: 0
BODYLANGUAGE: 0
TOTALSCORE: 0
NEGVOCALIZATION: 0
NEGVOCALIZATION: 0
FACIALEXPRESSION: 0
BODYLANGUAGE: 0
BREATHING: 0
CONSOLABILITY: 0
TOTALSCORE: 0
BODYLANGUAGE: 0
TOTALSCORE: 0
FACIALEXPRESSION: 0
FACIALEXPRESSION: 0
NEGVOCALIZATION: 0
BODYLANGUAGE: 0
CONSOLABILITY: 0
CONSOLABILITY: 0
BODYLANGUAGE: 0
BODYLANGUAGE: 0
TOTALSCORE: 0
TOTALSCORE: 0
BREATHING: 0
BREATHING: 0
FACIALEXPRESSION: 0
NEGVOCALIZATION: 0
FACIALEXPRESSION: 0
CONSOLABILITY: 0
TOTALSCORE: 0
TOTALSCORE: 0
BREATHING: 0
CONSOLABILITY: 0
BODYLANGUAGE: 0
BODYLANGUAGE: 0
CONSOLABILITY: 0
FACIALEXPRESSION: 0
CONSOLABILITY: 0
NEGVOCALIZATION: 0
BODYLANGUAGE: 0
FACIALEXPRESSION: 0
TOTALSCORE: 0
FACIALEXPRESSION: 0
BREATHING: 0
FACIALEXPRESSION: 0
BODYLANGUAGE: 0
NEGVOCALIZATION: 0
NEGVOCALIZATION: 0
BREATHING: 0
BREATHING: 0
FACIALEXPRESSION: 0
TOTALSCORE: 0
CONSOLABILITY: 0
CONSOLABILITY: 0
TOTALSCORE: 0
BREATHING: 0
NEGVOCALIZATION: 0
BODYLANGUAGE: 0
NEGVOCALIZATION: 0
TOTALSCORE: 0
CONSOLABILITY: 0
FACIALEXPRESSION: 0
CONSOLABILITY: 0
BREATHING: 0

## 2020-07-27 ASSESSMENT — PAIN SCALES - GENERAL
PAINLEVEL_OUTOF10: 3
PAINLEVEL_OUTOF10: 0
PAINLEVEL_OUTOF10: 0

## 2020-07-27 NOTE — PROGRESS NOTES
Messaged Dr. Tamiko Tejeda: I read your notes and saw that you wanted a crenshaw inserted, I also heard you mention it in the room. I do not see any orders and pt did NOT have one inserted downstairs. Would you like this done? Reply: Please scan bladder. If greater than 400 mL, and patient unable to void please place one. Do not however wake her up and get her agitated if she can void on her own. It is something that could be done during days, but yes it was supposed to be placed in the ED because she had 400 mL of urine in her bladder at that time    Messaged Dr. Tamiko Tejeda: Its over 600 ML right now and she if refusing to move her hands or let me move or touch her. Would you like me to try? Or would you like to order something for her so that I can try in a few minutes? I know you said they gave her Haldol in the ED but I also cannot find that anywhere in her chart or notes    Reply: Give 1 mg IV Ativan once But have fully ready and able for insertion because you probably wont have much time in [facility] of her history of alcohol abuse    This RN inserted crenshaw at 0315 after administration of 1mg IV Ativan. Pt was combative earlier in the shift during assessment so Dr. Tamiko Tejeda ordered the ativan for crenshaw insertion. Pt calm and cooperative during insertion. Continuing potassium replacement. Last bag due at 0430. Unable to collect stool sample.  Dr. Tamiko Tejeda would like glycerin enema performed this AM.

## 2020-07-27 NOTE — PROGRESS NOTES
4 Eyes Skin Assessment     The patient is being assess for  Admission    I agree that 2 RN's have performed a thorough Head to Toe Skin Assessment on the patient. ALL assessment sites listed below have been assessed. Areas assessed by both nurses: Nia MINA and Alba Valdes RN  [x]   Head, Face, and Ears   [x]   Shoulders, Back, and Chest  [x]   Arms, Elbows, and Hands   [x]   Coccyx, Sacrum, and IschIum  [x]   Legs, Feet, and Heels        Does the Patient have Skin Breakdown?   Yes LDA WOUND CARE was Initiated documentation include the Yasmine-wound, Wound Assessment, Measurements, Dressing Treatment, Drainage, and Color\",         Dandy Prevention initiated:  Yes   Wound Care Orders initiated:  Yes      [address]  nurse consulted for Pressure Injury (Stage 3,4, Unstageable, DTI, NWPT, and Complex wounds), New and Established Ostomies:  Yes      Nurse 1 eSignature: Electronically signed by Ross Silverman RN on 7/27/20 at 3:52 AM EDT    **SHARE this note so that the co-signing nurse is able to place an eSignature**    Nurse 2 eSignature: Electronically signed by Juanita Albert RN on 7/27/20 at 3:53 AM EDT

## 2020-07-27 NOTE — PROGRESS NOTES
Assessment complete. Hypotension will notify MD. Patient resting in bed. Respirations even and easy. Call light in reach. Fall precautions in place. No needs expressed at this time. Will continue to monitor.

## 2020-07-27 NOTE — PROGRESS NOTES
[facility] PROGRESS NOTE    7/27/2020 10:27 AM        Name: Nicol Pablo . Admitted: 7/26/2020  Primary Care Provider: Odette Frye MD (Tel: [phone])    Brief Course:      CC: abd pain    Subjective:  .   Patient not very cooperative but does follows commands    Reviewed interval ancillary notes    Current Medications  polyethylene glycol (GLYCOLAX) packet 17 g, Daily  0.9 % sodium chloride infusion, Continuous  vitamin B-12 (CYANOCOBALAMIN) tablet 1,000 mcg, Daily  donepezil (ARICEPT) tablet 5 mg, Nightly  folic acid (FOLVITE) tablet 1 mg, Daily  vitamin B-1 (THIAMINE) tablet 100 mg, Daily  amLODIPine (NORVASC) tablet 5 mg, Daily  sodium chloride flush 0.9 % injection 10 mL, PRN  potassium chloride (KLOR-CON M) extended release tablet 40 mEq, PRN    Or  potassium bicarb-citric acid (EFFER-K) effervescent tablet 40 mEq, PRN    Or  potassium chloride 10 mEq/100 mL IVPB (Peripheral Line), PRN  magnesium sulfate 1 g in dextrose 5% 100 mL IVPB, PRN  acetaminophen (TYLENOL) tablet 650 mg, Q6H PRN    Or  acetaminophen (TYLENOL) suppository 650 mg, Q6H PRN  ipratropium-albuterol (DUONEB) nebulizer solution 1 ampule, Q4H PRN  ondansetron (ZOFRAN-ODT) disintegrating tablet 4 mg, Q8H PRN    Or  ondansetron (ZOFRAN) injection 4 mg, Q6H PRN  pantoprazole (PROTONIX) injection 40 mg, Daily  nicotine (NICODERM CQ) 21 MG/24HR 1 patch, Q24H  enoxaparin (LOVENOX) injection 40 mg, Daily  metronidazole (FLAGYL) 500 mg in NaCl 100 mL IVPB premix, Q8H  ciprofloxacin (CIPRO) IVPB 400 mg, Q12H        Objective:  BP (!) 91/58   Pulse 81   Temp 97.9 °F (36.6 °C) (Axillary)   Resp 16   Ht 5' 6\" (1.676 m)   Wt 121 lb 11.1 oz (55.2 kg)   SpO2 92%   BMI 19.64 kg/m²     Intake/Output Summary (Last 24 hours) at 7/27/2020 1027  Last data filed at 7/27/2020 0649  Gross per 24 hour   Intake 1000 ml   Output 750 ml   Net 250 ml      Wt Readings from Last 3 Encounters:   07/27/20 121 lb 11.1 oz (55.2 kg)   01/20/17 121 lb 7.6 oz (55.1 kg)   12/25/15 140 lb (63.5 kg)       General appearance:  Appears comfortable  Eyes: Sclera clear. Pupils equal.  ENT: Moist oral mucosa. Trachea midline, no adenopathy. Cardiovascular: Regular rhythm, normal S1, S2. No murmur. No edema in lower extremities  Respiratory: Not using accessory muscles. Good inspiratory effort. Clear to auscultation bilaterally, no wheeze or crackles. GI: Abdomen soft, no tenderness, not distended, normal bowel sounds  Musculoskeletal: No cyanosis in digits, neck supple  Neurology: CN 2-12 grossly intact. No speech or motor deficits  Psych: Normal affect. Alert and oriented in time, place and person  Skin: Warm, dry, normal turgor  Extremity exam shows brisk capillary refill. Peripheral pulses are palpable in lower extremities     Labs and Tests:  CBC:   Recent Labs     07/26/20  1144 07/27/20  0527   WBC 11.9* 8.7   HGB 13.7 11.8*    291     BMP:    Recent Labs     07/26/20  1144 07/27/20  0526    140   K 3.0* 3.6    109   CO2 19* 20*   BUN 12 9   CREATININE 0.6 0.5*   GLUCOSE 90 83     Hepatic:   Recent Labs     07/26/20  1144 07/27/20  0526   AST 33 25   ALT 19 14   BILITOT 0.3 <0.2   ALKPHOS 101 91     XR CHEST PORTABLE   Final Result   Possible acute infiltrate such as pneumonia medial and central lower left   lung. Pulmonary sequela typical of that seen with smoking, including COPD. Calcific atherosclerotic disease aorta. CT ABDOMEN PELVIS W IV CONTRAST Additional Contrast? None   Final Result   1. Colonic wall thickening with perienteric inflammatory changes in the   distal sigmoid colon and rectum concerning for stercoral colitis. Mild   retained stool. 2. No other acute findings within the abdomen or pelvis. 3. Degenerated calcified uterine fibroids. 4. Moderate distention of the urinary bladder.                Problem List  Principal Problem:    Acute colitis  Active Problems:    Hypokalemia    Acute diarrhea    Dementia with behavioral disturbance (HCC)    Pressure injury of skin of right buttock    History of alcohol abuse  Resolved Problems:    * No resolved hospital problems.  *       Assessment & Plan:   Acute colitis  With stool impaction suggesting stercoral colitis  Cipro Flagyl to continue, full liquid diet, GI input already obtained, IV fluids to continue as patient appears dehydrated    History of alcohol abuse [facility]    History of dementia with behavioral disturbance    COPD with tobacco abuse  Stable    History of breast cancer stage II invasive lobular carcinoma of left breast  On Arimidex      IV Access: Peripheral  Ramires: No  Diet: DIET BARIATRIC FULL LIQUID;  Code:Full Code  DVT PPX Lovenox  Disposition unclear      Iesha Bruner MD   7/27/2020 10:27 AM

## 2020-07-27 NOTE — CONSULTS
Gastroenterology Consult Note      Patient: Augie Alba  : 1950  Acct#:      Date:  2020    Subjective:       History of Present Illness  Patient is a 81 y.o.   female admitted with Acute colitis [K52.9]  Acute colitis [K52.9] who is seen in consult for colitis. H/o invasive lobar carcinoma of the breast for which she is on an aromatase inhibitor. H/o mild dementia but was living at home recently. H/o alcohol abuse so was admitted to [facility] for detox. Her daughter is at bedside. She was sent to the ED for 4 day h/o diarrhea. CT in the ED showed stool in the rectum and thickening in the distal sigmoid colon and rectum concerning for colitis. Daughter does not think pt has had prior colonoscopy. Past Medical History:   Diagnosis Date    Cancer ([facility] [address]     cancer    Hypertension     Pneumonia     Sickle cell trait ([facility] [address]       Past Surgical History:   Procedure Laterality Date    HEMORRHOID SURGERY      TUBAL LIGATION        Past Endoscopic History: none in chart    Admission Meds  No current facility-administered medications on file prior to encounter.       Current Outpatient Medications on File Prior to Encounter   Medication Sig Dispense Refill    amLODIPine (NORVASC) 5 MG tablet Take 5 mg by mouth daily      anastrozole (ARIMIDEX) 1 MG tablet Take 1 mg by mouth daily      calcium-vitamin D (OSCAL-500) 500-200 MG-UNIT per tablet Take 1 tablet by mouth 2 times daily      cyanocobalamin 1000 MCG tablet Take 1,000 mcg by mouth daily      donepezil (ARICEPT) 5 MG tablet Take 5 mg by mouth nightly      escitalopram (LEXAPRO) 10 MG tablet Take 10 mg by mouth daily      folic acid (FOLVITE) 1 MG tablet Take 1 mg by mouth daily      vitamin B-1 (THIAMINE) 100 MG tablet Take 100 mg by mouth daily      ibuprofen (ADVIL;MOTRIN) 800 MG tablet Take 800 mg by mouth daily      HYDROcodone-acetaminophen (NORCO) 5-325 MG per tablet Take 1 tablet by mouth every 6 hours as needed for Pain (Takes it once or twice a day)      hydrochlorothiazide (HYDRODIURIL) 25 MG tablet Take 25 mg by mouth daily           Allergies  No Known Allergies   Social   Social History     Tobacco Use    Smoking status: Current Every Day Smoker     Packs/day: 1.00     Types: Cigarettes    Smokeless tobacco: Never Used   Substance Use Topics    Alcohol use: Yes     Comment: at times        History reviewed. No pertinent family history. Review of Systems  Review of systems not obtained due to patient factors. Physical Exam  Blood pressure 109/69, pulse 74, temperature 97.8 °F (36.6 °C), temperature source Axillary, resp. rate 16, height 5' 6\" (1.676 m), weight 121 lb 11.1 oz (55.2 kg), SpO2 93 %. General appearance: alert, confused, no distress, appears stated age  Eyes: Anicteric  Head: Normocephalic, without obvious abnormality  Lungs: clear to auscultation bilaterally, Normal Effort  Heart: regular rate and rhythm, normal S1 and S2, no murmurs or rubs  Abdomen: soft, non-tender. Bowel sounds normal. No masses,  no organomegaly. Extremities: atraumatic, no cyanosis or edema  Skin: warm and dry, no jaundice  Neuro: confused  Musculoskeletal: 5/5  strength BUE  Rectal: hemorrhoid, soft brown stool in rectal vault - removed. Data Review:    Recent Labs     07/26/20  1144 07/27/20  0527   WBC 11.9* 8.7   HGB 13.7 11.8*   HCT 41.8 35.2*   MCV 96.9 97.6    291     Recent Labs     07/26/20  1144 07/27/20  0526    140   K 3.0* 3.6    109   CO2 19* 20*   BUN 12 9   CREATININE 0.6 0.5*     Recent Labs     07/26/20  1144 07/27/20  0526   AST 33 25   ALT 19 14   BILITOT 0.3 <0.2   ALKPHOS 101 91     Recent Labs     07/26/20  1144   LIPASE 15.0     No results for input(s): PROTIME, INR in the last 72 hours. No results for input(s): PTT in the last 72 hours. No results for input(s): OCCULTBLD in the last 72 hours.     Imaging Studies:                              CT-scan of abdomen and pelvis w IV contrast 7/26/20     Impression    1. Colonic wall thickening with perienteric inflammatory changes in the    distal sigmoid colon and rectum concerning for stercoral colitis.  Mild    retained stool. 2. No other acute findings within the abdomen or pelvis. 3. Degenerated calcified uterine fibroids. 4. Moderate distention of the urinary bladder.                          Assessment:     Principal Problem:    Acute colitis  Active Problems:    Hypokalemia    Acute diarrhea    Dementia with behavioral disturbance (HCC)    Pressure injury of skin of right buttock    History of alcohol abuse  Resolved Problems:    * No resolved hospital problems. *    Colitis - pt presented with diarrhea which was likely overflow diarrhea as CT showed retained stool in rectum. There is colon wall thickening in the distal sigmoid and rectum which could indicate stercoral colitis from fecal impaction. Soft stool removed digitally. Altered mental status - per primary team  H/o alcohol abuse    Recommendations:   - rec daily miralax to prevent constipation when pt alert enough to take PO.  - cipro and flagyl   - rec f/u colonoscopy in 6-8 weeks     Discussed with Dr. Seble Lawson, PA-  [address]  I have personally performed a face to face diagnostic evaluation on this patient. I have interviewed and examined the patient and I agree with the findings and recommended plan of care. In summary, my findings and plan are the following: As above, elderly woman with dementia likely from EtOH, in part, with acute colitis on CT and chronic constipation with overflow diarrhea all c/w stercoral colitis. She was disimpacted today. Agree with catharsis, empiric antibiotics and elective colonoscopy as outpatient -- as described above. Discussed with patient and daughter. They agree with plan.   Diet as tolerated ok from GI standpoint and discharge ok when tolerating diet and having BM's.     Gene Pride MD  [address]  7/27/2020

## 2020-07-28 LAB
ANION GAP SERPL CALCULATED.3IONS-SCNC: 12 MMOL/L (ref 3–16)
BUN BLDV-MCNC: 7 MG/DL (ref 7–20)
CALCIUM SERPL-MCNC: 8.5 MG/DL (ref 8.3–10.6)
CHLORIDE BLD-SCNC: 108 MMOL/L (ref 99–110)
CO2: 21 MMOL/L (ref 21–32)
CREAT SERPL-MCNC: 0.7 MG/DL (ref 0.6–1.2)
GFR AFRICAN AMERICAN: >60
GFR NON-AFRICAN AMERICAN: >60
GLUCOSE BLD-MCNC: 86 MG/DL (ref 70–99)
HCT VFR BLD CALC: 35 % (ref 36–48)
HEMOGLOBIN: 11.7 G/DL (ref 12–16)
MAGNESIUM: 1.8 MG/DL (ref 1.8–2.4)
MCH RBC QN AUTO: 31.8 PG (ref 26–34)
MCHC RBC AUTO-ENTMCNC: 33.5 G/DL (ref 31–36)
MCV RBC AUTO: 95.1 FL (ref 80–100)
PDW BLD-RTO: 12.5 % (ref 12.4–15.4)
PLATELET # BLD: 316 K/UL (ref 135–450)
PMV BLD AUTO: 8.7 FL (ref 5–10.5)
POTASSIUM REFLEX MAGNESIUM: 3.3 MMOL/L (ref 3.5–5.1)
RBC # BLD: 3.68 M/UL (ref 4–5.2)
SODIUM BLD-SCNC: 141 MMOL/L (ref 136–145)
WBC # BLD: 6.6 K/UL (ref 4–11)

## 2020-07-28 PROCEDURE — 80048 BASIC METABOLIC PNL TOTAL CA: CPT

## 2020-07-28 PROCEDURE — 1200000000 HC SEMI PRIVATE

## 2020-07-28 PROCEDURE — 36415 COLL VENOUS BLD VENIPUNCTURE: CPT

## 2020-07-28 PROCEDURE — 94760 N-INVAS EAR/PLS OXIMETRY 1: CPT

## 2020-07-28 PROCEDURE — C9113 INJ PANTOPRAZOLE SODIUM, VIA: HCPCS | Performed by: HOSPITALIST

## 2020-07-28 PROCEDURE — 6370000000 HC RX 637 (ALT 250 FOR IP): Performed by: HOSPITALIST

## 2020-07-28 PROCEDURE — 83735 ASSAY OF MAGNESIUM: CPT

## 2020-07-28 PROCEDURE — 6360000002 HC RX W HCPCS: Performed by: HOSPITALIST

## 2020-07-28 PROCEDURE — 2580000003 HC RX 258: Performed by: INTERNAL MEDICINE

## 2020-07-28 PROCEDURE — 85027 COMPLETE CBC AUTOMATED: CPT

## 2020-07-28 PROCEDURE — 2500000003 HC RX 250 WO HCPCS: Performed by: HOSPITALIST

## 2020-07-28 PROCEDURE — 2580000003 HC RX 258: Performed by: HOSPITALIST

## 2020-07-28 PROCEDURE — 6370000000 HC RX 637 (ALT 250 FOR IP): Performed by: INTERNAL MEDICINE

## 2020-07-28 RX ADMIN — CIPROFLOXACIN 400 MG: 2 INJECTION, SOLUTION INTRAVENOUS at 09:03

## 2020-07-28 RX ADMIN — Medication 100 MG: at 09:03

## 2020-07-28 RX ADMIN — METRONIDAZOLE 500 MG: 500 INJECTION, SOLUTION INTRAVENOUS at 20:16

## 2020-07-28 RX ADMIN — CIPROFLOXACIN 400 MG: 2 INJECTION, SOLUTION INTRAVENOUS at 21:24

## 2020-07-28 RX ADMIN — POLYETHYLENE GLYCOL 3350 17 G: 17 POWDER, FOR SOLUTION ORAL at 09:03

## 2020-07-28 RX ADMIN — AMLODIPINE BESYLATE 5 MG: 5 TABLET ORAL at 09:03

## 2020-07-28 RX ADMIN — SODIUM CHLORIDE: 9 INJECTION, SOLUTION INTRAVENOUS at 01:37

## 2020-07-28 RX ADMIN — METRONIDAZOLE 500 MG: 500 INJECTION, SOLUTION INTRAVENOUS at 05:17

## 2020-07-28 RX ADMIN — PANTOPRAZOLE SODIUM 40 MG: 40 INJECTION, POWDER, FOR SOLUTION INTRAVENOUS at 09:03

## 2020-07-28 RX ADMIN — ACETAMINOPHEN 650 MG: 325 TABLET, FILM COATED ORAL at 13:41

## 2020-07-28 RX ADMIN — ENOXAPARIN SODIUM 40 MG: 40 INJECTION SUBCUTANEOUS at 09:03

## 2020-07-28 RX ADMIN — CYANOCOBALAMIN TAB 1000 MCG 1000 MCG: 1000 TAB at 09:03

## 2020-07-28 RX ADMIN — DONEPEZIL HYDROCHLORIDE 5 MG: 5 TABLET, FILM COATED ORAL at 20:28

## 2020-07-28 RX ADMIN — METRONIDAZOLE 500 MG: 500 INJECTION, SOLUTION INTRAVENOUS at 11:58

## 2020-07-28 RX ADMIN — FOLIC ACID 1 MG: 1 TABLET ORAL at 09:03

## 2020-07-28 RX ADMIN — Medication 10 ML: at 09:03

## 2020-07-28 RX ADMIN — POTASSIUM CHLORIDE 40 MEQ: 1500 TABLET, EXTENDED RELEASE ORAL at 11:58

## 2020-07-28 ASSESSMENT — PAIN SCALES - PAIN ASSESSMENT IN ADVANCED DEMENTIA (PAINAD)
FACIALEXPRESSION: 0
BREATHING: 0
BREATHING: 0
TOTALSCORE: 0
CONSOLABILITY: 0
BREATHING: 0
BREATHING: 0
CONSOLABILITY: 0
BREATHING: 0
FACIALEXPRESSION: 0
BODYLANGUAGE: 0
NEGVOCALIZATION: 0
CONSOLABILITY: 0
NEGVOCALIZATION: 0
TOTALSCORE: 0
FACIALEXPRESSION: 0
NEGVOCALIZATION: 0
BODYLANGUAGE: 0
CONSOLABILITY: 0
BODYLANGUAGE: 0
BODYLANGUAGE: 0
NEGVOCALIZATION: 0
TOTALSCORE: 0
BODYLANGUAGE: 0
NEGVOCALIZATION: 0
TOTALSCORE: 0
NEGVOCALIZATION: 0
CONSOLABILITY: 0
FACIALEXPRESSION: 0
FACIALEXPRESSION: 0
CONSOLABILITY: 0
FACIALEXPRESSION: 0
TOTALSCORE: 0
TOTALSCORE: 0
NEGVOCALIZATION: 0
NEGVOCALIZATION: 0
FACIALEXPRESSION: 0
NEGVOCALIZATION: 0
BREATHING: 0
BREATHING: 0
CONSOLABILITY: 0
BREATHING: 0
CONSOLABILITY: 0
BODYLANGUAGE: 0
FACIALEXPRESSION: 0
TOTALSCORE: 0
BODYLANGUAGE: 0
CONSOLABILITY: 0
FACIALEXPRESSION: 0
CONSOLABILITY: 0
NEGVOCALIZATION: 0
TOTALSCORE: 0
BREATHING: 0
FACIALEXPRESSION: 0
FACIALEXPRESSION: 0
NEGVOCALIZATION: 0
TOTALSCORE: 0
CONSOLABILITY: 0
BREATHING: 0
TOTALSCORE: 0
TOTALSCORE: 0
BODYLANGUAGE: 0
BODYLANGUAGE: 0
BREATHING: 0

## 2020-07-28 ASSESSMENT — PAIN SCALES - GENERAL
PAINLEVEL_OUTOF10: 1
PAINLEVEL_OUTOF10: 0
PAINLEVEL_OUTOF10: 3
PAINLEVEL_OUTOF10: 0

## 2020-07-28 NOTE — PROGRESS NOTES
[facility] PROGRESS NOTE    7/28/2020 1:17 PM        Name: Ange Galaviz . Admitted: 7/26/2020  Primary Care Provider: Cliffton Jeans, MD (Tel: [phone])    Brief Course: Transfer from [facility] was there for alcohol detox. Lives alone. Mentation improved during the stay. CT abnormal for stercoral colitis and constipation, GI on the case    CC: abd pain    Subjective:  .   Daughter at bedside, patient is much more awake as compared to yesterday, still confused still    Reviewed interval ancillary notes    Current Medications  polyethylene glycol (GLYCOLAX) packet 17 g, Daily  0.9 % sodium chloride infusion, Continuous  vitamin B-12 (CYANOCOBALAMIN) tablet 1,000 mcg, Daily  donepezil (ARICEPT) tablet 5 mg, Nightly  folic acid (FOLVITE) tablet 1 mg, Daily  vitamin B-1 (THIAMINE) tablet 100 mg, Daily  amLODIPine (NORVASC) tablet 5 mg, Daily  sodium chloride flush 0.9 % injection 10 mL, PRN  potassium chloride (KLOR-CON M) extended release tablet 40 mEq, PRN    Or  potassium bicarb-citric acid (EFFER-K) effervescent tablet 40 mEq, PRN    Or  potassium chloride 10 mEq/100 mL IVPB (Peripheral Line), PRN  magnesium sulfate 1 g in dextrose 5% 100 mL IVPB, PRN  acetaminophen (TYLENOL) tablet 650 mg, Q6H PRN    Or  acetaminophen (TYLENOL) suppository 650 mg, Q6H PRN  ipratropium-albuterol (DUONEB) nebulizer solution 1 ampule, Q4H PRN  ondansetron (ZOFRAN-ODT) disintegrating tablet 4 mg, Q8H PRN    Or  ondansetron (ZOFRAN) injection 4 mg, Q6H PRN  pantoprazole (PROTONIX) injection 40 mg, Daily  nicotine (NICODERM CQ) 21 MG/24HR 1 patch, Q24H  enoxaparin (LOVENOX) injection 40 mg, Daily  metronidazole (FLAGYL) 500 mg in NaCl 100 mL IVPB premix, Q8H  ciprofloxacin (CIPRO) IVPB 400 mg, Q12H        Objective:  BP (!) 90/53   Pulse 72   Temp 98 °F (36.7 °C) (Oral)   Resp 16   Ht 5' 6\" (1.676 m)   Wt 124 lb 9 oz (56.5 kg)   SpO2 94%   BMI 20.10 kg/m²     Intake/Output Summary (Last 24 hours) at 7/28/2020 1317  Last data filed at 7/27/2020 1544  Gross per 24 hour   Intake 220 ml   Output 350 ml   Net -130 ml      Wt Readings from Last 3 Encounters:   07/28/20 124 lb 9 oz (56.5 kg)   01/20/17 121 lb 7.6 oz (55.1 kg)   12/25/15 140 lb (63.5 kg)       General appearance:  Appears comfortable  Eyes: Sclera clear. Pupils equal.  ENT: Moist oral mucosa. Trachea midline, no adenopathy. Cardiovascular: Regular rhythm, normal S1, S2. No murmur. No edema in lower extremities  Respiratory: Not using accessory muscles. Good inspiratory effort. Clear to auscultation bilaterally, no wheeze or crackles. GI: Abdomen soft, no tenderness, not distended, normal bowel sounds  Musculoskeletal: No cyanosis in digits, neck supple  Neurology: CN 2-12 grossly intact. No speech or motor deficits  Psych: Normal affect. Alert and oriented in time, place and person  Skin: Warm, dry, normal turgor  Extremity exam shows brisk capillary refill. Peripheral pulses are palpable in lower extremities     Labs and Tests:  CBC:   Recent Labs     07/26/20  1144 07/27/20  0527 07/28/20  0526   WBC 11.9* 8.7 6.6   HGB 13.7 11.8* 11.7*    291 316     BMP:    Recent Labs     07/26/20  1144 07/27/20  0526 07/28/20  0526    140 141   K 3.0* 3.6 3.3*    109 108   CO2 19* 20* 21   BUN 12 9 7   CREATININE 0.6 0.5* 0.7   GLUCOSE 90 83 86     Hepatic:   Recent Labs     07/26/20  1144 07/27/20  0526   AST 33 25   ALT 19 14   BILITOT 0.3 <0.2   ALKPHOS 101 91     XR CHEST PORTABLE   Final Result   Possible acute infiltrate such as pneumonia medial and central lower left   lung. Pulmonary sequela typical of that seen with smoking, including COPD. Calcific atherosclerotic disease aorta. CT ABDOMEN PELVIS W IV CONTRAST Additional Contrast? None   Final Result   1.  Colonic wall thickening with perienteric inflammatory changes in the   distal sigmoid colon and rectum concerning for stercoral colitis. Mild   retained stool. 2. No other acute findings within the abdomen or pelvis. 3. Degenerated calcified uterine fibroids. 4. Moderate distention of the urinary bladder. Problem List  Principal Problem:    Acute colitis  Active Problems:    Hypokalemia    Acute diarrhea    Dementia with behavioral disturbance (HCC)    Pressure injury of skin of right buttock    History of alcohol abuse  Resolved Problems:    * No resolved hospital problems. *       Assessment & Plan:   Acute colitis  With stool impaction suggesting stercoral colitis  Cipro Flagyl to continue, full liquid diet, GI input noticed, will discontinue fluids today, did had a bowel movement,      History of alcohol abuse was at [facility] for that  PRN Ativan, mentation improved as compared to yesterday, no obvious signs of significant withdrawal    History of dementia with behavioral disturbance    COPD with tobacco abuse  Stable    History of breast cancer stage II invasive lobular carcinoma of left breast  On Arimidex    Debility  PT OT consult       IV Access: Peripheral  Ramires: No  Diet: DIET DYSPHAGIA SOFT AND BITE-SIZED;   Code:Full Code  DVT PPX Lovenox  Disposition hopefully tomorrow      Mckayla Wolff MD   7/28/2020 1:17 PM

## 2020-07-28 NOTE — CARE COORDINATION
CM went and spoke to pt to discuss poc for d/c. Pt lives alone but states \"son stays with me sometimes. \"  Discussed with pt possible therapy recommendations and she states \" I don't want no home care, my son stays with me sometimes\" pt also said Ian Mustafa do you keep asking questions? \" CM explained role again and asked pt if she would like me to call and speak to her daughter. Pt states that is fine. CM called daughter Moise Haynes and she states that pt lives alone but on discharge she will be staying with her mom for awhile to make sure everything is fine. Discussed therapy evaluations pending and possible recommendations. CM and daughter will talk again after evals done. CM verified pt's address as correct but daughter wants her phone number used to reach her and pt. Phone number is [phone]. CM will continue to follow for d/c needs.      Mariama Brown RN, BSN  [phone]

## 2020-07-28 NOTE — PROGRESS NOTES
Assessment complete. VSS. Patient resting in bed. Respirations even and easy. Call light in reach. Fall precautions in place. No needs expressed at this time. Will continue to monitor. Pt calm and cooperative with staff. Wanting to go home.

## 2020-07-28 NOTE — PROGRESS NOTES
[facility]  Gastroenterology Progress Note    Eitan Benson is a 81 y.o. female patient. Principal Problem:    Acute colitis  Active Problems:    Hypokalemia    Acute diarrhea    Dementia with behavioral disturbance (HCC)    Pressure injury of skin of right buttock    History of alcohol abuse  Resolved Problems:    * No resolved hospital problems. *      SUBJECTIVE:  Had several solid BMs. Has perianal excoriation per RN and pt c/o pain from this. No abdominal pain. ROS:  No fever, chills  No chest pain, palpitations  No SOB, cough  Gastrointestinal ROS: see above    Physical    VITALS:  BP (!) 90/53   Pulse 72   Temp 98 °F (36.7 °C) (Oral)   Resp 16   Ht 5' 6\" (1.676 m)   Wt 124 lb 9 oz (56.5 kg)   SpO2 94%   BMI 20.10 kg/m²   TEMPERATURE:  Current - Temp: 98 °F (36.7 °C); Max - Temp  Av.1 °F (36.7 °C)  Min: 97.6 °F (36.4 °C)  Max: 98.7 °F (37.1 °C)    NAD  Regular rate   Lungs CTA Bilaterally  Abdomen soft, ND, NT,  Bowel sounds normal.    Data    Data Review:    Recent Labs     20  1144 20  0527 20  0526   WBC 11.9* 8.7 6.6   HGB 13.7 11.8* 11.7*   HCT 41.8 35.2* 35.0*   MCV 96.9 97.6 95.1    291 316     Recent Labs     20  1144 20  0526 20  0526    140 141   K 3.0* 3.6 3.3*    109 108   CO2 19* 20* 21   BUN 12 9 7   CREATININE 0.6 0.5* 0.7     Recent Labs     20  1144 20  0526   AST 33 25   ALT 19 14   BILITOT 0.3 <0.2   ALKPHOS 101 91     Recent Labs     20  1144   LIPASE 15.0     No results for input(s): PROTIME, INR in the last 72 hours. No results for input(s): PTT in the last 72 hours. ASSESSMENT :    Colitis - pt presented with diarrhea which was likely overflow diarrhea as CT showed retained stool in rectum. There is colon wall thickening in the distal sigmoid and rectum which could indicate stercoral colitis from fecal impaction. Soft stool removed digitally yesterday. Having BMs with miralax.     Altered mental

## 2020-07-28 NOTE — PROGRESS NOTES
Pt K 3.3 Replace per order. Advise patient to take one pill at a time.  Pt responds \"Dont tell me I know how to take a pill\"

## 2020-07-29 LAB
ANION GAP SERPL CALCULATED.3IONS-SCNC: 8 MMOL/L (ref 3–16)
BUN BLDV-MCNC: 4 MG/DL (ref 7–20)
CALCIUM SERPL-MCNC: 8.6 MG/DL (ref 8.3–10.6)
CHLORIDE BLD-SCNC: 112 MMOL/L (ref 99–110)
CO2: 20 MMOL/L (ref 21–32)
CREAT SERPL-MCNC: 0.6 MG/DL (ref 0.6–1.2)
GFR AFRICAN AMERICAN: >60
GFR NON-AFRICAN AMERICAN: >60
GLUCOSE BLD-MCNC: 104 MG/DL (ref 70–99)
HCT VFR BLD CALC: 37.9 % (ref 36–48)
HEMOGLOBIN: 12.4 G/DL (ref 12–16)
MAGNESIUM: 1.8 MG/DL (ref 1.8–2.4)
MCH RBC QN AUTO: 32 PG (ref 26–34)
MCHC RBC AUTO-ENTMCNC: 32.8 G/DL (ref 31–36)
MCV RBC AUTO: 97.3 FL (ref 80–100)
PDW BLD-RTO: 12.5 % (ref 12.4–15.4)
PLATELET # BLD: 311 K/UL (ref 135–450)
PMV BLD AUTO: 7.8 FL (ref 5–10.5)
POTASSIUM REFLEX MAGNESIUM: 3.2 MMOL/L (ref 3.5–5.1)
RBC # BLD: 3.89 M/UL (ref 4–5.2)
SODIUM BLD-SCNC: 140 MMOL/L (ref 136–145)
WBC # BLD: 6.7 K/UL (ref 4–11)
WHITE BLOOD CELLS (WBC), STOOL: ABNORMAL

## 2020-07-29 PROCEDURE — 1200000000 HC SEMI PRIVATE

## 2020-07-29 PROCEDURE — 36415 COLL VENOUS BLD VENIPUNCTURE: CPT

## 2020-07-29 PROCEDURE — 80048 BASIC METABOLIC PNL TOTAL CA: CPT

## 2020-07-29 PROCEDURE — C9113 INJ PANTOPRAZOLE SODIUM, VIA: HCPCS | Performed by: HOSPITALIST

## 2020-07-29 PROCEDURE — 6370000000 HC RX 637 (ALT 250 FOR IP): Performed by: HOSPITALIST

## 2020-07-29 PROCEDURE — 97535 SELF CARE MNGMENT TRAINING: CPT

## 2020-07-29 PROCEDURE — 6360000002 HC RX W HCPCS: Performed by: HOSPITALIST

## 2020-07-29 PROCEDURE — 97530 THERAPEUTIC ACTIVITIES: CPT

## 2020-07-29 PROCEDURE — 2500000003 HC RX 250 WO HCPCS: Performed by: HOSPITALIST

## 2020-07-29 PROCEDURE — 83630 LACTOFERRIN FECAL (QUAL): CPT

## 2020-07-29 PROCEDURE — 85027 COMPLETE CBC AUTOMATED: CPT

## 2020-07-29 PROCEDURE — 97116 GAIT TRAINING THERAPY: CPT

## 2020-07-29 PROCEDURE — 2580000003 HC RX 258: Performed by: HOSPITALIST

## 2020-07-29 PROCEDURE — 87505 NFCT AGENT DETECTION GI: CPT

## 2020-07-29 PROCEDURE — 97162 PT EVAL MOD COMPLEX 30 MIN: CPT

## 2020-07-29 PROCEDURE — 97165 OT EVAL LOW COMPLEX 30 MIN: CPT

## 2020-07-29 PROCEDURE — 83735 ASSAY OF MAGNESIUM: CPT

## 2020-07-29 PROCEDURE — 6370000000 HC RX 637 (ALT 250 FOR IP): Performed by: INTERNAL MEDICINE

## 2020-07-29 RX ADMIN — METRONIDAZOLE 500 MG: 500 INJECTION, SOLUTION INTRAVENOUS at 04:00

## 2020-07-29 RX ADMIN — AMLODIPINE BESYLATE 5 MG: 5 TABLET ORAL at 08:50

## 2020-07-29 RX ADMIN — ENOXAPARIN SODIUM 40 MG: 40 INJECTION SUBCUTANEOUS at 08:51

## 2020-07-29 RX ADMIN — CYANOCOBALAMIN TAB 1000 MCG 1000 MCG: 1000 TAB at 08:50

## 2020-07-29 RX ADMIN — METRONIDAZOLE 500 MG: 500 INJECTION, SOLUTION INTRAVENOUS at 19:32

## 2020-07-29 RX ADMIN — DONEPEZIL HYDROCHLORIDE 5 MG: 5 TABLET, FILM COATED ORAL at 19:32

## 2020-07-29 RX ADMIN — PANTOPRAZOLE SODIUM 40 MG: 40 INJECTION, POWDER, FOR SOLUTION INTRAVENOUS at 08:49

## 2020-07-29 RX ADMIN — Medication 100 MG: at 08:50

## 2020-07-29 RX ADMIN — CIPROFLOXACIN 400 MG: 2 INJECTION, SOLUTION INTRAVENOUS at 21:05

## 2020-07-29 RX ADMIN — POLYETHYLENE GLYCOL 3350 17 G: 17 POWDER, FOR SOLUTION ORAL at 08:50

## 2020-07-29 RX ADMIN — POTASSIUM CHLORIDE 40 MEQ: 1500 TABLET, EXTENDED RELEASE ORAL at 08:50

## 2020-07-29 RX ADMIN — CIPROFLOXACIN 400 MG: 2 INJECTION, SOLUTION INTRAVENOUS at 08:50

## 2020-07-29 RX ADMIN — Medication 10 ML: at 08:50

## 2020-07-29 RX ADMIN — METRONIDAZOLE 500 MG: 500 INJECTION, SOLUTION INTRAVENOUS at 11:46

## 2020-07-29 RX ADMIN — FOLIC ACID 1 MG: 1 TABLET ORAL at 08:50

## 2020-07-29 ASSESSMENT — PAIN SCALES - PAIN ASSESSMENT IN ADVANCED DEMENTIA (PAINAD)
BODYLANGUAGE: 1
CONSOLABILITY: 0
TOTALSCORE: 5
FACIALEXPRESSION: 0
TOTALSCORE: 0
BREATHING: 0
BREATHING: 0
NEGVOCALIZATION: 0
BREATHING: 0
NEGVOCALIZATION: 0
TOTALSCORE: 0
NEGVOCALIZATION: 0
BODYLANGUAGE: 0
CONSOLABILITY: 0
NEGVOCALIZATION: 0
CONSOLABILITY: 2
FACIALEXPRESSION: 0
BREATHING: 0
BODYLANGUAGE: 0
BODYLANGUAGE: 0
TOTALSCORE: 0
FACIALEXPRESSION: 0
FACIALEXPRESSION: 0
BREATHING: 0
TOTALSCORE: 0
BODYLANGUAGE: 0
CONSOLABILITY: 0
CONSOLABILITY: 0
FACIALEXPRESSION: 0
FACIALEXPRESSION: 0
NEGVOCALIZATION: 0
NEGVOCALIZATION: 1
NEGVOCALIZATION: 0
BODYLANGUAGE: 0
FACIALEXPRESSION: 0
BREATHING: 0
CONSOLABILITY: 0
FACIALEXPRESSION: 1
CONSOLABILITY: 0
BREATHING: 0
CONSOLABILITY: 0
NEGVOCALIZATION: 0
NEGVOCALIZATION: 0
TOTALSCORE: 0
BODYLANGUAGE: 0
NEGVOCALIZATION: 0
FACIALEXPRESSION: 0
BODYLANGUAGE: 0
BODYLANGUAGE: 0
TOTALSCORE: 0
BREATHING: 0
TOTALSCORE: 0
BREATHING: 0
CONSOLABILITY: 0
TOTALSCORE: 0
BREATHING: 0
TOTALSCORE: 0
FACIALEXPRESSION: 0
CONSOLABILITY: 0
BODYLANGUAGE: 0

## 2020-07-29 ASSESSMENT — PAIN DESCRIPTION - DESCRIPTORS: DESCRIPTORS: ACHING

## 2020-07-29 ASSESSMENT — PAIN SCALES - GENERAL
PAINLEVEL_OUTOF10: 4
PAINLEVEL_OUTOF10: 0
PAINLEVEL_OUTOF10: 5

## 2020-07-29 ASSESSMENT — PAIN DESCRIPTION - LOCATION: LOCATION: BACK;BUTTOCKS

## 2020-07-29 NOTE — PROGRESS NOTES
Routine vitals stable. Scheduled medications given. Pt denies any further needs at this time. Camera I use. Bed alarm on. Call light within reach. Will continue to monitor.      Vitals:    07/29/20 1613   BP: 93/61   Pulse: 76   Resp: 16   Temp: 98 °F (36.7 °C)   SpO2: 99%

## 2020-07-29 NOTE — CARE COORDINATION
CM noted therapy recommendations for home care. CM spoke to pt and daughter in room and they are open to using [facility] as they have no preference. CM reviewed demographics with daughter and address is not where pt will be. Pt will be at:      [address]    Phone number to call is daughter's number and it is : [phone]. CM called Christiane Flores with [facility] and made referral. CM also sent message to physician for hc order and DME orders for cane and shower chair with back.     Ina Yoder RN, BSN  [phone]

## 2020-07-29 NOTE — CARE COORDINATION
CM received message back from physician that pt may discharge tomorrow and that home care orders and DME orders will be placed then.     Antonette Kay RN, BSN  [phone]

## 2020-07-29 NOTE — CARE COORDINATION
CM notified [facility] with [facility] about DME orders that will be placed on d/c. He is following pt.      Sanjeev Machado RN, BSN  [phone]

## 2020-07-29 NOTE — PROGRESS NOTES
Occupational Therapy   Occupational Therapy Initial Assessment  Date: 2020   Patient Name: Ange Galaviz  MRN: [id number]     : 1950    Date of Service: 2020    Discharge Recommendations:Vandana Abdullahi scored a 21/24 on the AM-PAC ADL Inpatient form. Current research shows that an AM-PAC score of 18 or greater is typically associated with a discharge to the patient's home setting. Based on the patient's AM-PAC score, and their current ADL deficits, it is recommended that the patient have 2-3 sessions per week of Occupational Therapy at d/c to increase the patient's independence. At this time, this patient demonstrates the endurance and safety to discharge home with HOME (home vs OP services) and a follow up treatment frequency of 2-3x/wk. Please see assessment section for further patient specific details. If patient discharges prior to next session this note will serve as a discharge summary. Please see below for the latest assessment towards goals. HOME HEALTH CARE: LEVEL 3 SAFETY     - Initial home health evaluation to occur within 24-48 hours, in patient home   - Therapy evaluations in home within 24-48 hours of discharge; including DME and home safety   - Frontload therapy 5 days, then 3x a week   - Therapy to evaluate if patient has [address] needs for personal care   -  evaluation within 24-48 hours, includes evaluation of resources and insurance to determine AL, [place], LTC, and Medicaid options        OT Equipment Recommendations  Equipment Needed: Yes  Mobility Devices: ADL Assistive Devices  ADL Assistive Devices: Shower Chair with back(to promote safety and assist with limited standing tolerance)    Assessment   Performance deficits / Impairments: Decreased functional mobility ; Decreased ADL status; Decreased safe awareness;Decreased cognition;Decreased endurance;Decreased balance;Decreased high-level IADLs  Treatment Diagnosis: Decreased ADLs, IADLs, transfers, mobility associated with Acute Colitis  Prognosis: Good;Fair  Decision Making: Low Complexity  OT Education: OT Role;Plan of Care;Family Education  Patient Education: Eval, discharge recommendations-patient verbalized understanding  REQUIRES OT FOLLOW UP: Yes  Activity Tolerance  Activity Tolerance: Treatment limited secondary to decreased cognition;Patient Tolerated treatment well  Safety Devices  Safety Devices in place: Yes  Type of devices: All fall risk precautions in place;Call light within reach; Chair alarm in place; Patient at risk for falls; Left in chair;Nurse notified           Patient Diagnosis(es): The encounter diagnosis was Colitis. has a past medical history of Cancer ([facility] [address] Hypertension, Pneumonia, and Sickle cell trait ([facility] [address] has a past surgical history that includes Hemorrhoid surgery and Tubal ligation. Treatment Diagnosis: Decreased ADLs, IADLs, transfers, mobility associated with Acute Colitis      Restrictions  Restrictions/Precautions  Restrictions/Precautions: Fall Risk(High fall risk)  Required Braces or Orthoses?: No(Daughter reports patient typically uses back brace due to chronic back pain)  Position Activity Restriction  Other position/activity restrictions: 81 y.o. female who presented to the ED from [facility] unit to be evaluated for 4-day history of diarrhea. The patient has a history of dementia with behavioral disturbance, and she has been verbally aggressive since arrival to the hospital.  As a result of this, the ED history is limited. Labs were obtained in addition to CT scan of the abdomen pelvis. The latter study showed findings concerning for acute colitis possibly stercoral in nature. During attempt to manually disimpact the patient in the ED, it was noted that she had a small decubitus excoriation on her right buttock. She also did not tolerate attempts to remove the formed stool bolus in her rectum.   Hospitalist consulted to admit this patient and provide care for a forementioned comorbidities    Subjective   General  Chart Reviewed: Yes  Family / Caregiver Present: Yes(Daughter during 2nd session)  Diagnosis: Acute Colitis  Subjective  Subjective: Patient supine in bed upon first session, recliner second session  Patient Currently in Pain: Denies(Simultaneous filing. User may not have seen previous data.)  Pre Treatment Pain Screening  Intervention List: Patient able to continue with treatment  Vital Signs  Temp: 98 °F (36.7 °C)  Temp Source: Oral  Pulse: 77  Heart Rate Source: Monitor  Resp: 17  BP: 88/64  BP Location: Left upper arm  BP Upper/Lower: Upper  MAP (mmHg): (!) 63  Patient Position: Up in chair  Patient Currently in Pain: Denies(Simultaneous filing. User may not have seen previous data.)  Oxygen Therapy  SpO2: 99 %  Social/Functional History  Social/Functional History  Lives With: Alone  Type of Home: House  ADL Assistance: Independent  Homemaking Assistance: Independent  Ambulation Assistance: Independent  Transfer Assistance: Independent  Active : No  Occupation: Retired  Additional Comments: Patient (per chart review) agitated from questioning, received vague information through conversation during 2 sessions       Objective   Hearing: Within functional limits    Orientation  Overall Orientation Status: Impaired  Orientation Level: Oriented to person     Balance  Sitting Balance: Independent  Standing Balance: Supervision  Standing Balance  Time: ~100 feet without AD and close supervision. Patient had one instance of LOB with turning in hallway, Milli  Wheelchair Bed Transfers  Wheelchair/Bed - Technique: Ambulating  Equipment Used:  Other  Level of Asssistance: Stand by assistance;Supervision  ADL  Feeding: Modified independent   Grooming: Supervision  LE Dressing: Stand by assistance;Supervision(donning pants)  Toileting: None  Additional Comments: Patient declned ADLs during first session, agreeable to LB dressing 2nd session  Tone RUE  RUE Tone: Normotonic  Tone LUE  LUE Tone: Normotonic  Coordination  Movements Are Fluid And Coordinated: Yes     Bed mobility  Comment: Not observed, patient supine in bed during first session, dressed LB (socks) with long sitting. Patient seated in recliner during second session  Transfers  Sit to stand: Stand by assistance  Stand to sit: Stand by assistance  Vision - Basic Assessment  Patient Visual Report: No visual complaint reported. Cognition  Overall Cognitive Status: Exceptions  Arousal/Alertness: Appropriate responses to stimuli  Following Commands: Follows one step commands with increased time; Follows one step commands with repetition  Memory: Decreased recall of recent events  Safety Judgement: Decreased awareness of need for safety;Decreased awareness of need for assistance  Problem Solving: Decreased awareness of errors  Insights: Decreased awareness of deficits  Initiation: Requires cues for some  Sequencing: Requires cues for some  Perception  Overall Perceptual Status: WFL     Sensation  Overall Sensation Status: WFL        LUE AROM (degrees)  LUE AROM : WFL  RUE AROM (degrees)  RUE AROM : WFL  LUE Strength  L Hand General: 4/5  RUE Strength  R Hand General: 4/5                   Plan   Plan  Times per week: 1-2  Times per day: Daily  Current Treatment Recommendations: Strengthening, Balance Training, Functional Mobility Training, Endurance Training, Safety Education & Training, Self-Care / ADL, Patient/Caregiver Education & Training, Equipment Evaluation, Education, & procurement      AM-PAC Score        AM-[facility] Inpatient Daily Activity Raw Score: 21 (07/29/20 1239)  AM-PAC Inpatient ADL T-Scale Score : 44.27 (07/29/20 1239)  ADL Inpatient CMS 0-100% Score: 32.79 (07/29/20 1239)  ADL Inpatient CMS G-Code Modifier : Marjan Craig (07/29/20 [facility]9)    Goals  Short term goals  Time Frame for Short term goals: Discharge  Short term goal 1: Bed mobility mod I  Short term goal 2: Functional ADL transfers mod I  Short term goal 3: Functional mobility I  Short term goal 4: UB ADLs I, LB ADLs mod I  Long term goals  Time Frame for Long term goals : LTG=STG  Patient Goals   Patient goals : Home       Therapy Time   Individual Concurrent Group Co-treatment   Time In 5763(3234)         Time Out 9535(3153)         Minutes 32,24              Timed Code Treatment Minutes:   41    Total Treatment Minutes:  [address], OTR/L [id number]

## 2020-07-29 NOTE — PLAN OF CARE
Problem: Falls - Risk of:  Goal: Will remain free from falls  Description: Will remain free from falls  7/29/2020 0945 by David Doll RN  Outcome: Ongoing  7/29/2020 0047 by Yisel Burgess RN  Outcome: Ongoing  Goal: Absence of physical injury  Description: Absence of physical injury  7/29/2020 0945 by David Doll RN  Outcome: Ongoing  7/29/2020 0047 by Yisel Burgess RN  Outcome: Ongoing     Problem: Skin Integrity:  Goal: Will show no infection signs and symptoms  Description: Will show no infection signs and symptoms  7/29/2020 0945 by David Doll RN  Outcome: Ongoing  7/29/2020 0047 by Yisel Burgess RN  Outcome: Ongoing  Goal: Absence of new skin breakdown  Description: Absence of new skin breakdown  7/29/2020 0945 by David Doll RN  Outcome: Ongoing  7/29/2020 0047 by Yisel Burgess RN  Outcome: Ongoing

## 2020-07-29 NOTE — PROGRESS NOTES
This RN attempted to get the patient's vitals and pass morning medications. Patient appears agitated and is being verbally aggressive. When RN attempted to put blood pressure cuff on patient's arm she states \"I'll put you on the ground\". Camera in use. Bed alarm on. Will continue to monitor.

## 2020-07-29 NOTE — PROGRESS NOTES
Physical Therapy    Facility/Department: [address] ONCOLOGY  Initial Assessment    NAME: Elier Nunez  : 1950  MRN: [id number]    Date of Service: 2020    Discharge Recommendations: Elier Nunez scored a 19/24 on the AM-PAC short mobility form. Current research shows that an AM-PAC score of 18 or greater is typically associated with a discharge to the patient's home setting. Based on the patient's AM-PAC score and their current functional mobility deficits, it is recommended that the patient have 2-3 sessions per week of Physical Therapy at d/c to increase the patient's independence. At this time, this patient demonstrates the endurance and safety to discharge home with HHPT and a follow up treatment frequency of 2-3x/wk. Please see assessment section for further patient specific details. HOME HEALTH CARE: LEVEL 3 SAFETY  - Initial home health evaluation to occur within 24-48 hours, in patient home   - Therapy evaluations in home within 24-48 hours of discharge; including DME and home safety   - Frontload therapy 5 days, then 3x a week   - Therapy to evaluate if patient has [address] needs for personal care   -  evaluation within 24-48 hours, includes evaluation of resources and insurance to determine AL, IL, LTC, and Medicaid options     If patient discharges prior to next session this note will serve as a discharge summary. Please see below for the latest assessment towards goals. PT Equipment Recommendations  Equipment Needed: Yes  Mobility Devices: Canes  Cane: Straight Cane    Assessment   Body structures, Functions, Activity limitations: Decreased balance;Decreased endurance;Decreased strength  Assessment: Pt presents slightly below baseline functional mobility with impaired functional strength and endurance and decreased standing balance impairing her ability to perform functional mobility safely and independently.  Pt would benefit from acute PT services to address deficits and facilitate return to OF of independence. Treatment Diagnosis: impaired gait, transfers, and balance  Prognosis: Good  Decision Making: Medium Complexity  Clinical Presentation: evolving  PT Education: Goals;PT Role;Plan of Care;Transfer Training;General Safety;Orientation; Family Education;Equipment;Gait Training;Functional Mobility Training  Patient Education: d/c recommendations, use of cane at home for mobility--pt and daughter verbalizing understanding  Barriers to Learning: cognition  REQUIRES PT FOLLOW UP: Yes  Activity Tolerance  Activity Tolerance: Patient Tolerated treatment well       Patient Diagnosis(es): The encounter diagnosis was Colitis. has a past medical history of Cancer ([facility] [address] Hypertension, Pneumonia, and Sickle cell trait ([facility] [address] has a past surgical history that includes Hemorrhoid surgery and Tubal ligation. Restrictions  Restrictions/Precautions  Restrictions/Precautions: Fall Risk(High fall risk)  Required Braces or Orthoses?: No(Daughter reports patient typically uses back brace due to chronic back pain)  Position Activity Restriction  Other position/activity restrictions: 81 y.o. female who presented to the ED from [facility] memory unit to be evaluated for 4-day history of diarrhea. The patient has a history of dementia with behavioral disturbance, and she has been verbally aggressive since arrival to the hospital.  As a result of this, the ED history is limited. Labs were obtained in addition to CT scan of the abdomen pelvis. The latter study showed findings concerning for acute colitis possibly stercoral in nature. During attempt to manually disimpact the patient in the ED, it was noted that she had a small decubitus excoriation on her right buttock. She also did not tolerate attempts to remove the formed stool bolus in her rectum.   Hospitalist consulted to admit this patient and provide care for a forementioned comorbidities     Vision/Hearing  Vision: Within Functional Limits  Hearing: Within functional limits       Subjective  General  Chart Reviewed: Yes  Response To Previous Treatment: Not applicable  Family / Caregiver Present: Yes(no visitor present during first session; daughter present during second session)  Diagnosis: acute colitis  Follows Commands: Impaired  General Comment  Comments: Pt supine in bed upon arrival during first session. Pt seated in recliner during session. Subjective  Subjective: Pt reporting no pain at this time. Agreeable to PT/OT session initially and then stating \"I'm cold/chilly\" and refusing OOB mobility. Agreeable to OOB mobility during second session when daughter present. Pain Screening  Patient Currently in Pain: Denies(Simultaneous filing. User may not have seen previous data.)  Vital Signs  Patient Currently in Pain: Denies(Simultaneous filing. User may not have seen previous data.)       Orientation  Orientation  Overall Orientation Status: Impaired  Orientation Level: Oriented to person     Social/Functional History  Social/Functional History  Lives With: Alone  Type of Home: House  ADL Assistance: Independent  Homemaking Assistance: Independent  Ambulation Assistance: Independent  Transfer Assistance: Independent  Active : No  Occupation: Retired  Additional Comments: Patient (per chart review) agitated from questioning, received vague information through conversation during 2 sessions     Cognition   Cognition  Overall Cognitive Status: Exceptions  Arousal/Alertness: Appropriate responses to stimuli  Following Commands: Follows one step commands with increased time; Follows one step commands with repetition  Memory: Decreased recall of recent events  Safety Judgement: Decreased awareness of need for safety;Decreased awareness of need for assistance  Problem Solving: Decreased awareness of errors  Insights: Decreased awareness of deficits  Initiation: Requires cues for some  Sequencing: Requires cues for some    Objective  AROM RLE (degrees)  RLE AROM: WFL  AROM LLE (degrees)  LLE AROM : WFL  Strength RLE  Strength RLE: WFL  Comment: unable to formally assess, grossly 3+/5 based on observation of functional mobility  Strength LLE  Strength LLE: WFL  Comment: unable to formally assess, grossly 3+/5 based on observation of functional mobility  Tone RLE  RLE Tone: Normotonic  Tone LLE  LLE Tone: Normotonic  Motor Control  Gross Motor?: WFL  Sensation  Overall Sensation Status: WFL  Bed mobility  Comment: Not addressed, pt supine in bed during first session and able to lean forward into long sitting to [place] socks however refusing further mobility. Pt seated in recliner during second session. Transfers  Sit to Stand: Supervision(x2 recliner)  Stand to sit: Supervision  Ambulation  Ambulation?: Yes  Ambulation 1  Surface: level tile  Device: No Device  Assistance: Stand by assistance  Quality of Gait: slighlty widened Mayra, slightly staggered stance with LLE leading, progressively forward flexed trunk (pt attributing to back pain)  Distance: 100'  Comments: Pt intermittently reaching for damon rail for balance. Pt dancing slightly in damon with PCA during ambulation without LOB. One posterior LOB when turning to return to room (pant leg noted to be trailing on floor) and pt required min A to maintain balance. No other LOB. Pt and daughter educated on use of cane for safe mobility--both verbalizing understanding.   Stairs/Curb  Stairs?: No     Balance  Posture: Good  Sitting - Static: Good;-  Sitting - Dynamic: Good;-  Standing - Static: Fair;+  Standing - Dynamic: Fair;+        Plan   Plan  Times per week: 3-5x  Times per day: Daily  Current Treatment Recommendations: Strengthening, Balance Training, Functional Mobility Training, Transfer Training, Endurance Training, Gait Training, Neuromuscular Re-education, Safety Education & Training, Modalities, Equipment Evaluation, Education, & procurement, Patient/Caregiver Education

## 2020-07-29 NOTE — DISCHARGE INSTR - COC
Continuity of Care Form    Patient Name: Christen Jefferson   :  1950  MRN:  [id number]    Admit date:  2020  Discharge date:  2020    Code Status Order: Full Code   Advance Directives:   [facility] Documentation     Date/Time Healthcare Directive Type of Healthcare Directive Copy in [address] Agent's Name Healthcare Agent's Phone Number    [phone]  Yes, patient has an advance directive for healthcare treatment  Durable power of  for health care  Yes, copy in chart  Adult Children  --  --          Admitting Physician:  Liu Barbosa MD  PCP: Shari Brennan MD    Discharging Nurse: [address] [place] [address] Unit/Room#: 1YX-0925/1340-58  Discharging Unit Phone Number: [phone]    Emergency Contact:   Extended Emergency Contact Information  Primary Emergency Contact: [address] [place] [address] Phone: [phone]  Relation: Brother/Sister  Secondary Emergency Contact: mariia griggs  [place] Phone: [phone]  Relation: Child  Preferred language: English    Past Surgical History:  Past Surgical History:   Procedure Laterality Date    HEMORRHOID SURGERY      TUBAL LIGATION         Immunization History:   Immunization History   Administered Date(s) Administered    Tdap (Boostrix, Adacel) 2017       Active Problems:  Patient Active Problem List   Diagnosis Code    Acute colitis K52.9    Hypokalemia E87.6    Acute diarrhea R19.7    Dementia with behavioral disturbance ([facility] [address] F03.91    Pressure injury of skin of right buttock L89.319    History of alcohol abuse F10.11       Isolation/Infection:   Isolation          No Isolation        Patient Infection Status     Infection Onset Added Last Indicated Last Indicated By Review Planned Expiration Resolved Resolved By    None active    Resolved    COVID-19 Rule Out 20 COVID-19 (Ordered)   20 Rule-Out Test Resulted    C-diff Rule Out 20 07/26/20 GI Bacterial Pathogens By PCR (Ordered)   07/28/20 Mina Johnson RN          Nurse Assessment:  Last Vital Signs: BP 88/64   Pulse 77   Temp 98 °F (36.7 °C) (Oral)   Resp 17   Ht 5' 6\" (1.676 m)   Wt 124 lb 9 oz (56.5 kg)   SpO2 99%   BMI 20.10 kg/m²     Last documented pain score (0-10 scale): Pain Level: 0  Last Weight:   Wt Readings from Last 1 Encounters:   07/28/20 124 lb 9 oz (56.5 kg)     Mental Status:  disoriented and alert    IV Access:  - None    Nursing Mobility/ADLs:  Walking   Assisted  Transfer  Assisted  Bathing  Assisted  Dressing  Assisted  Toileting  Assisted  Feeding  Assisted  Med Admin  Independent  Med Delivery   whole    Wound Care Documentation and Therapy:        Elimination:  Continence:   · Bowel: No  · Bladder: No  Urinary Catheter: None   Colostomy/Ileostomy/Ileal Conduit: No       Date of Last BM: 7/31/20    Intake/Output Summary (Last 24 hours) at 8/1/2020 1146  Last data filed at 7/31/2020 1906  Gross per 24 hour   Intake 960 ml   Output --   Net 960 ml     I/O last 3 completed shifts: In: 3027 [P.O.:360; I.V.:2667]  Out: 850 [Urine:850]    Safety Concerns: At Risk for Falls    Impairments/Disabilities:      None    Nutrition Therapy:  Current Nutrition Therapy:   - Oral Diet:  Dysphagia 3 advanced and soft and bite sized    Routes of Feeding: Nasogastric  Liquids: No Restrictions  Daily Fluid Restriction: no  Last Modified Barium Swallow with Video (Video Swallowing Test): not done    Treatments at the Time of Hospital Discharge:   Respiratory Treatments:   Oxygen Therapy:  is not on home oxygen therapy.   Ventilator:    - No ventilator support    Rehab Therapies: SN,PT,OT, MSW, HCA  Weight Bearing Status/Restrictions: No weight bearing restirctions  Other Medical Equipment (for information only, NOT a DME order):  walker  Other Treatments: R Buttocks [address]: LEVEL 3 SAFETY        -Initial home health evaluation to occur within 24-48

## 2020-07-29 NOTE — PROGRESS NOTES
Hospitalist Progress Note      PCP: Katelynn Greenberg MD    Date of Admission: 7/26/2020    Chief Complaint: abd pain      Subjective:   Abd pain is resolved. Denies n/v.   Mental status appears improved. Medications:  Reviewed    Infusion Medications   Scheduled Medications    polyethylene glycol  17 g Oral Daily    cyanocobalamin  1,000 mcg Oral Daily    donepezil  5 mg Oral Nightly    folic acid  1 mg Oral Daily    vitamin B-1  100 mg Oral Daily    amLODIPine  5 mg Oral Daily    pantoprazole  40 mg Intravenous Daily    nicotine  1 patch Transdermal Q24H    enoxaparin  40 mg Subcutaneous Daily    metroNIDAZOLE  500 mg Intravenous Q8H    ciprofloxacin  400 mg Intravenous Q12H     PRN Meds: sodium chloride flush, potassium chloride **OR** potassium alternative oral replacement **OR** potassium chloride, magnesium sulfate, acetaminophen **OR** acetaminophen, ipratropium-albuterol, ondansetron **OR** ondansetron      Intake/Output Summary (Last 24 hours) at 7/29/2020 1534  Last data filed at 7/29/2020 1233  Gross per 24 hour   Intake 480 ml   Output --   Net 480 ml       Exam:    BP 88/64   Pulse 77   Temp 98 °F (36.7 °C) (Oral)   Resp 17   Ht 5' 6\" (1.676 m)   Wt 124 lb 9 oz (56.5 kg)   SpO2 99%   BMI 20.10 kg/m²     Gen/overall appearance: Not in acute distress. Alert. Head: Normocephalic, atraumatic  Eyes: EOMI, no scleral icterus  CVS: regular rate and rhythm, Normal S1S2  Pulm: Clear to auscultation bilaterally. No crackles/wheezes  Gastrointestinal: Soft, nontender, nondistended, no guarding or rebound  Extremities: No edema.  No erythema or warmth  Neuro: No gross focal deficits noted  Skin: Warm, dry    Labs:   Recent Labs     07/27/20  0527 07/28/20  0526 07/29/20  0510   WBC 8.7 6.6 6.7   HGB 11.8* 11.7* 12.4   HCT 35.2* 35.0* 37.9    316 311     Recent Labs     07/27/20  0526 07/28/20  0526 07/29/20  0510    141 140   K 3.6 3.3* 3.2*    108 112*   CO2 20* 21 20* BUN 9 7 4*   CREATININE 0.5* 0.7 0.6   CALCIUM 8.9 8.5 8.6     Recent Labs     07/27/20  0526   AST 25   ALT 14   BILITOT <0.2   ALKPHOS 91     No results for input(s): INR in the last 72 hours. No results for input(s): Qiana Kuhn in the last 72 hours. Assessment/Plan:    Active Hospital Problems    Diagnosis Date Noted    Acute colitis [K52.9] 07/26/2020    Hypokalemia [E87.6] 07/26/2020    Acute diarrhea [R19.7] 07/26/2020    Dementia with behavioral disturbance ([facility] [address] [F03.91] 07/26/2020    Pressure injury of skin of right buttock [L89.319] 07/26/2020    History of alcohol abuse [F10.11] 07/26/2020     Acute colitis with stool impaction suggesting stercoral colitis  C/w Cipro Flagyl  Advance diet as tolerated  GI following     History of alcohol abuse. Was at [facility] protocol  PRN Ativan  Monitor for withdrawal     History of dementia with behavioral disturbance     COPD with tobacco abuse; compensated at this time     History of breast cancer stage II invasive lobular carcinoma of left breast  On Arimidex     Debility  PT OT consult    R decubitus ulcer R buttocks  Wound care following    Diet: DIET DYSPHAGIA SOFT AND BITE-SIZED;   Code Status: Full Code    Dispo - Arleen Lugo MD

## 2020-07-30 LAB
ANION GAP SERPL CALCULATED.3IONS-SCNC: 11 MMOL/L (ref 3–16)
BLOOD CULTURE, ROUTINE: NORMAL
BUN BLDV-MCNC: 4 MG/DL (ref 7–20)
CALCIUM SERPL-MCNC: 8.4 MG/DL (ref 8.3–10.6)
CHLORIDE BLD-SCNC: 107 MMOL/L (ref 99–110)
CO2: 20 MMOL/L (ref 21–32)
CREAT SERPL-MCNC: 0.7 MG/DL (ref 0.6–1.2)
CULTURE, BLOOD 2: NORMAL
GFR AFRICAN AMERICAN: >60
GFR NON-AFRICAN AMERICAN: >60
GI BACTERIAL PATHOGENS BY PCR: NORMAL
GLUCOSE BLD-MCNC: 115 MG/DL (ref 70–99)
HCT VFR BLD CALC: 33.9 % (ref 36–48)
HEMOGLOBIN: 11.2 G/DL (ref 12–16)
MCH RBC QN AUTO: 31.8 PG (ref 26–34)
MCHC RBC AUTO-ENTMCNC: 33 G/DL (ref 31–36)
MCV RBC AUTO: 96.3 FL (ref 80–100)
PDW BLD-RTO: 12.5 % (ref 12.4–15.4)
PLATELET # BLD: 308 K/UL (ref 135–450)
PMV BLD AUTO: 8.2 FL (ref 5–10.5)
POTASSIUM REFLEX MAGNESIUM: 3.9 MMOL/L (ref 3.5–5.1)
RBC # BLD: 3.52 M/UL (ref 4–5.2)
SODIUM BLD-SCNC: 138 MMOL/L (ref 136–145)
WBC # BLD: 9.2 K/UL (ref 4–11)

## 2020-07-30 PROCEDURE — 1200000000 HC SEMI PRIVATE

## 2020-07-30 PROCEDURE — 2500000003 HC RX 250 WO HCPCS: Performed by: HOSPITALIST

## 2020-07-30 PROCEDURE — 2580000003 HC RX 258: Performed by: HOSPITALIST

## 2020-07-30 PROCEDURE — 80048 BASIC METABOLIC PNL TOTAL CA: CPT

## 2020-07-30 PROCEDURE — 6360000002 HC RX W HCPCS: Performed by: INTERNAL MEDICINE

## 2020-07-30 PROCEDURE — 94761 N-INVAS EAR/PLS OXIMETRY MLT: CPT

## 2020-07-30 PROCEDURE — 6370000000 HC RX 637 (ALT 250 FOR IP): Performed by: HOSPITALIST

## 2020-07-30 PROCEDURE — 6360000002 HC RX W HCPCS: Performed by: PHYSICIAN ASSISTANT

## 2020-07-30 PROCEDURE — 85027 COMPLETE CBC AUTOMATED: CPT

## 2020-07-30 PROCEDURE — 36415 COLL VENOUS BLD VENIPUNCTURE: CPT

## 2020-07-30 PROCEDURE — 6360000002 HC RX W HCPCS: Performed by: HOSPITALIST

## 2020-07-30 PROCEDURE — 6370000000 HC RX 637 (ALT 250 FOR IP): Performed by: INTERNAL MEDICINE

## 2020-07-30 RX ORDER — HALOPERIDOL 5 MG/ML
1 INJECTION INTRAMUSCULAR EVERY 6 HOURS PRN
Status: DISCONTINUED | OUTPATIENT
Start: 2020-07-30 | End: 2020-08-01 | Stop reason: HOSPADM

## 2020-07-30 RX ORDER — HALOPERIDOL 5 MG/ML
INJECTION INTRAMUSCULAR
Status: DISPENSED
Start: 2020-07-30 | End: 2020-07-30

## 2020-07-30 RX ORDER — LORAZEPAM 2 MG/ML
1 INJECTION INTRAMUSCULAR EVERY 6 HOURS PRN
Status: DISCONTINUED | OUTPATIENT
Start: 2020-07-30 | End: 2020-08-01 | Stop reason: HOSPADM

## 2020-07-30 RX ADMIN — AMLODIPINE BESYLATE 5 MG: 5 TABLET ORAL at 11:32

## 2020-07-30 RX ADMIN — LORAZEPAM 1 MG: 2 INJECTION INTRAMUSCULAR; INTRAVENOUS at 22:27

## 2020-07-30 RX ADMIN — METRONIDAZOLE 500 MG: 500 INJECTION, SOLUTION INTRAVENOUS at 17:37

## 2020-07-30 RX ADMIN — LORAZEPAM 1 MG: 2 INJECTION INTRAMUSCULAR; INTRAVENOUS at 00:55

## 2020-07-30 RX ADMIN — LORAZEPAM 1 MG: 2 INJECTION INTRAMUSCULAR; INTRAVENOUS at 15:49

## 2020-07-30 RX ADMIN — ACETAMINOPHEN 650 MG: 325 TABLET, FILM COATED ORAL at 11:27

## 2020-07-30 RX ADMIN — Medication 10 ML: at 15:49

## 2020-07-30 RX ADMIN — Medication 10 ML: at 14:24

## 2020-07-30 RX ADMIN — HALOPERIDOL LACTATE 1 MG: 5 INJECTION, SOLUTION INTRAMUSCULAR at 10:31

## 2020-07-30 RX ADMIN — Medication 100 MG: at 11:33

## 2020-07-30 RX ADMIN — CIPROFLOXACIN 400 MG: 2 INJECTION, SOLUTION INTRAVENOUS at 22:28

## 2020-07-30 RX ADMIN — FOLIC ACID 1 MG: 1 TABLET ORAL at 11:33

## 2020-07-30 RX ADMIN — ACETAMINOPHEN 650 MG: 325 TABLET, FILM COATED ORAL at 01:31

## 2020-07-30 RX ADMIN — HALOPERIDOL LACTATE 1 MG: 5 INJECTION, SOLUTION INTRAMUSCULAR at 17:00

## 2020-07-30 ASSESSMENT — PAIN SCALES - PAIN ASSESSMENT IN ADVANCED DEMENTIA (PAINAD)
BODYLANGUAGE: 1
NEGVOCALIZATION: 0
CONSOLABILITY: 0
NEGVOCALIZATION: 0
TOTALSCORE: 1
FACIALEXPRESSION: 0
BODYLANGUAGE: 2
FACIALEXPRESSION: 0
FACIALEXPRESSION: 2
NEGVOCALIZATION: 0
TOTALSCORE: 6
TOTALSCORE: 0
FACIALEXPRESSION: 2
TOTALSCORE: 0
TOTALSCORE: 0
NEGVOCALIZATION: 0
BODYLANGUAGE: 1
NEGVOCALIZATION: 0
BREATHING: 0
CONSOLABILITY: 0
CONSOLABILITY: 1
BODYLANGUAGE: 2
TOTALSCORE: 0
CONSOLABILITY: 0
BREATHING: 0
FACIALEXPRESSION: 0
BODYLANGUAGE: 2
CONSOLABILITY: 0
BREATHING: 0
TOTALSCORE: 0
BREATHING: 0
FACIALEXPRESSION: 0
CONSOLABILITY: 0
BREATHING: 0
BODYLANGUAGE: 2
FACIALEXPRESSION: 2
BREATHING: 1
BODYLANGUAGE: 0
BODYLANGUAGE: 2
CONSOLABILITY: 2
NEGVOCALIZATION: 0
FACIALEXPRESSION: 0
CONSOLABILITY: 2
CONSOLABILITY: 2
CONSOLABILITY: 0
BREATHING: 1
FACIALEXPRESSION: 0
BREATHING: 0
BREATHING: 0
NEGVOCALIZATION: 0
FACIALEXPRESSION: 0
BREATHING: 0
TOTALSCORE: 1
BREATHING: 0
FACIALEXPRESSION: 1
TOTALSCORE: 5
NEGVOCALIZATION: 0
CONSOLABILITY: 0
FACIALEXPRESSION: 0
TOTALSCORE: 0
CONSOLABILITY: 0
FACIALEXPRESSION: 2
NEGVOCALIZATION: 0
TOTALSCORE: 4
FACIALEXPRESSION: 1
NEGVOCALIZATION: 0
TOTALSCORE: 0
BODYLANGUAGE: 0
BODYLANGUAGE: 0
NEGVOCALIZATION: 0
BODYLANGUAGE: 0
BODYLANGUAGE: 0
TOTALSCORE: 6
TOTALSCORE: 6
CONSOLABILITY: 2
CONSOLABILITY: 0
BODYLANGUAGE: 0
BREATHING: 0
BREATHING: 0
BODYLANGUAGE: 0
TOTALSCORE: 6
NEGVOCALIZATION: 1
NEGVOCALIZATION: 0
CONSOLABILITY: 1
BREATHING: 0
NEGVOCALIZATION: 0
NEGVOCALIZATION: 0
FACIALEXPRESSION: 0
BREATHING: 0
BODYLANGUAGE: 0

## 2020-07-30 ASSESSMENT — PAIN SCALES - GENERAL
PAINLEVEL_OUTOF10: 6
PAINLEVEL_OUTOF10: 1
PAINLEVEL_OUTOF10: 5
PAINLEVEL_OUTOF10: 0
PAINLEVEL_OUTOF10: 1
PAINLEVEL_OUTOF10: 0

## 2020-07-30 ASSESSMENT — PAIN SCALES - WONG BAKER
WONGBAKER_NUMERICALRESPONSE: 0

## 2020-07-30 ASSESSMENT — PAIN DESCRIPTION - LOCATION: LOCATION: BACK

## 2020-07-30 NOTE — FLOWSHEET NOTE
Approx 0030, pt became increasingly confused and began to get agitated. Pt was redirected, reassured, and attempts were made by this nurse to calm pt. Pt developed positive relationships with both PCAs and they attempted to redirect and calm pt which worked intermittently. However, pt would not return to bed for anyone. Pt began to threaten this nurse and other staff members. Charge RN contacted MD and retained orders for 1mg Ativan. She was able to provide med to pt without incidence. After approx 30 minutes, pt was able to be redirected back to bed by this nurse. Additional pain medication was provided and pt calmed down and closed eyes. Pt remains on camera and this nurse is posted near pt's room when charting/as permitted. Will continue to monitor.

## 2020-07-30 NOTE — PROGRESS NOTES
Pt calm at this time; however, attempted to connect IV for antibiotics and pt became aggressive. Medication held.  Electronically signed by Indy Traylor RN on 7/30/2020 at 1:01 PM

## 2020-07-30 NOTE — PROGRESS NOTES
Message to hosp:    Nurse is with pt in the damon, having difficulty with her behaviorally. May we have an order for prn Ativan. Pt has been anxious during shift and is now agitated, not staying in room. Thank you. Yasmin Sanchez Electronically signed by Diego Diamond RN on 7/30/2020 at 12:17 AM

## 2020-07-30 NOTE — PROGRESS NOTES
Hospitalist Progress Note      PCP: Danay Galeano MD    Date of Admission: 7/26/2020    Chief Complaint: abd pain      Subjective:   Confused and agitated overnight. Denies acute complaints at this time but poor historian  Continued diarrhea per nursing    Medications:  Reviewed    Infusion Medications   Scheduled Medications    haloperidol lactate        polyethylene glycol  17 g Oral Daily    cyanocobalamin  1,000 mcg Oral Daily    donepezil  5 mg Oral Nightly    folic acid  1 mg Oral Daily    vitamin B-1  100 mg Oral Daily    amLODIPine  5 mg Oral Daily    pantoprazole  40 mg Intravenous Daily    nicotine  1 patch Transdermal Q24H    enoxaparin  40 mg Subcutaneous Daily    metroNIDAZOLE  500 mg Intravenous Q8H    ciprofloxacin  400 mg Intravenous Q12H     PRN Meds: LORazepam, haloperidol lactate, sodium chloride flush, potassium chloride **OR** potassium alternative oral replacement **OR** potassium chloride, magnesium sulfate, acetaminophen **OR** acetaminophen, ipratropium-albuterol, ondansetron **OR** ondansetron      Intake/Output Summary (Last 24 hours) at 7/30/2020 1346  Last data filed at 7/29/2020 2000  Gross per 24 hour   Intake 10 ml   Output --   Net 10 ml       Exam:    /78   Pulse 76   Temp 97.6 °F (36.4 °C) (Axillary)   Resp 16   Ht 5' 6\" (1.676 m)   Wt 124 lb 9 oz (56.5 kg)   SpO2 96%   BMI 20.10 kg/m²     Gen/overall appearance: Not in acute distress. Alert. Poor historian. Head: Normocephalic, atraumatic  Eyes: EOMI, no scleral icterus  CVS: regular rate and rhythm, Normal S1S2  Pulm: Clear to auscultation bilaterally. No crackles/wheezes  Gastrointestinal: Soft, nontender, nondistended, no guarding or rebound  Extremities: No edema. No erythema or warmth  Neuro: No gross focal deficits noted.   Confused  Skin: Warm, dry    Labs:   Recent Labs     07/28/20  0526 07/29/20  0510 07/30/20  0512   WBC 6.6 6.7 9.2   HGB 11.7* 12.4 11.2*   HCT 35.0* 37.9 33.9*   PLT 316 311 308     Recent Labs     07/28/20  0526 07/29/20  0510 07/30/20  0512    140 138   K 3.3* 3.2* 3.9    112* 107   CO2 21 20* 20*   BUN 7 4* 4*   CREATININE 0.7 0.6 0.7   CALCIUM 8.5 8.6 8.4     No results for input(s): AST, ALT, BILIDIR, BILITOT, ALKPHOS in the last 72 hours. No results for input(s): INR in the last 72 hours. No results for input(s): Eren Lust in the last 72 hours. Assessment/Plan:    Active Hospital Problems    Diagnosis Date Noted    Acute colitis [K52.9] 07/26/2020    Hypokalemia [E87.6] 07/26/2020    Acute diarrhea [R19.7] 07/26/2020    Dementia with behavioral disturbance ([facility] [address] [F03.91] 07/26/2020    Pressure injury of skin of right buttock [L89.319] 07/26/2020    History of alcohol abuse [F10.11] 07/26/2020     Acute colitis with stool impaction suggesting stercoral colitis  C/w Cipro and Flagyl  Diet advanced  Stool studies  Gentle IVF hydration  GI following     History of alcohol abuse. Was at [facility] protocol  PRN Ativan  Monitor for withdrawal     History of dementia with behavioral disturbance     COPD with tobacco abuse; compensated at this time     History of breast cancer stage II invasive lobular carcinoma of left breast  On Arimidex     Debility  PT OT following    R decubitus ulcer R buttocks  Wound care following    Diet: DIET DYSPHAGIA SOFT AND BITE-SIZED;   Code Status: Full Code    Dispo - Liset Peña MD

## 2020-07-30 NOTE — FLOWSHEET NOTE
Approx 0530, pt woke up and attempted to get up out of bed, triggering the bed alarm. When two nurses entered her room, pt became exceptionally agitated. This nurse has remained in the pt's room since, keeping pt calm and in bed. Pt continues to refuse all medications and will not take pain medication or allow administration of ativan to calm her. Pt continues to be fixated on the people who \"broke into [her] house and stole [her] stuff\". Pt also resists attempts to reorient her. Pt has not threatened this nurse at this time. Will continue to monitor.

## 2020-07-30 NOTE — PROGRESS NOTES
Pt given prn ativan per IV. Primary nurse aware.   .Electronically signed by Joie Villegas RN on 7/30/2020 at 12:55 AM

## 2020-07-30 NOTE — FLOWSHEET NOTE
Assessment complete. VSS, see flowsheet. Medications administered, see MAR. Pt's family at bedside. Pt denies needs, appears comfortable. Pt has call light in reach. Bed in lowest position, wheels locked, and bed alarm on. No visible needs at this time. Will continue to monitor.

## 2020-07-30 NOTE — PROGRESS NOTES
1549: Pt getting OOB and getting verbally and physically abusive. Ativan administered. This RN remaining at bedside. 1653: Pt remains agitated. This RN has remained at bedside for >1 hour. Sitter at bedside.  Electronically signed by Yue Naqvi RN on 7/30/2020 at 4:55 PM

## 2020-07-30 NOTE — PROGRESS NOTES
Pt remains agitated. Swearing at staff. Getting OOB. Haldol given. Sitter at bedside.  Electronically signed by Manley Apley, RN on 7/30/2020 at 5:04 PM

## 2020-07-30 NOTE — PROGRESS NOTES
Pt trying to get OOB. Wants her cigarettes, wallet, and a cold beer. Attempted to orient pt. Unsuccessful. Pt out in hallway. Unable to redirect pt back to room. Pt became combative when trying to access IV. MD notified for IM medication. Medication administered with assistance of other staff members. Able to get pt to return to bed. Pericare complete and new mepilex applied to coccyx with the assistance of 4 staff members. Pt repositioned in bed. Pt agreeable to eat breakfast.  Breakfast heated and new coffee given. Camera in place. Bed alarm and posey set.

## 2020-07-30 NOTE — PROGRESS NOTES
Pt not allowing any care to be completed. Assisted pt to the restroom, but pt physically pushing away help. Attempted to help pt clean self and she became verbally and physically aggressive. Pt assisted back to bed. Attempted to obtain vital signs. Pt states \"If you touch me again, you're going to be laying on that floor in 10 seconds\". Pt did allow an assessment. Attempted again to obtain vital signs. Pt stated \"get your ugly ass away from me right now\". Attempted to give medication. Pt refused. Pt states \"get me a cold beer than get away\". Attempted to administer IV medication. Pt batted hand away, stating \"I told you not to touch me\". Pt frequently requesting a \"cold beer\" and her \"cigarettes back\". Attempted to orient pt. Unsuccessful. Pt calm in bed, but becomes combative with care. Safety camera in place. Will continue to monitor.  Electronically signed by Teresa Delgado RN on 7/30/2020 at 9:54 AM

## 2020-07-30 NOTE — PLAN OF CARE
Problem: Falls - Risk of:  Goal: Will remain free from falls  Description: Will remain free from falls  7/29/2020 2224 by Sil Banks RN  Outcome: Ongoing     Problem: Falls - Risk of:  Goal: Absence of physical injury  Description: Absence of physical injury  7/29/2020 2224 by Sil Banks RN  Outcome: Ongoing     Problem: Skin Integrity:  Goal: Will show no infection signs and symptoms  Description: Will show no infection signs and symptoms  7/29/2020 2224 by Sil Banks RN  Outcome: Ongoing     Problem: Skin Integrity:  Goal: Absence of new skin breakdown  Description: Absence of new skin breakdown  7/29/2020 2224 by Sil Banks RN  Outcome: Ongoing    Problem: Pain:  Goal: Pain level will decrease  Description: Pain level will decrease  Outcome: Ongoing     Problem: Pain:  Goal: Control of acute pain  Description: Control of acute pain  Outcome: Ongoing     Problem: Pain:  Goal: Control of chronic pain  Description: Control of chronic pain  Outcome: Ongoing

## 2020-07-31 LAB
ANION GAP SERPL CALCULATED.3IONS-SCNC: 13 MMOL/L (ref 3–16)
BUN BLDV-MCNC: 3 MG/DL (ref 7–20)
CALCIUM SERPL-MCNC: 8.7 MG/DL (ref 8.3–10.6)
CHLORIDE BLD-SCNC: 105 MMOL/L (ref 99–110)
CO2: 19 MMOL/L (ref 21–32)
CREAT SERPL-MCNC: 0.6 MG/DL (ref 0.6–1.2)
GFR AFRICAN AMERICAN: >60
GFR NON-AFRICAN AMERICAN: >60
GLUCOSE BLD-MCNC: 85 MG/DL (ref 70–99)
HCT VFR BLD CALC: 36.3 % (ref 36–48)
HEMOGLOBIN: 12.8 G/DL (ref 12–16)
MCH RBC QN AUTO: 33.3 PG (ref 26–34)
MCHC RBC AUTO-ENTMCNC: 35.4 G/DL (ref 31–36)
MCV RBC AUTO: 94 FL (ref 80–100)
PDW BLD-RTO: 13 % (ref 12.4–15.4)
PLATELET # BLD: 277 K/UL (ref 135–450)
PLATELET SLIDE REVIEW: ADEQUATE
PMV BLD AUTO: 8 FL (ref 5–10.5)
POTASSIUM REFLEX MAGNESIUM: 4.4 MMOL/L (ref 3.5–5.1)
RBC # BLD: 3.86 M/UL (ref 4–5.2)
SLIDE REVIEW: ABNORMAL
SODIUM BLD-SCNC: 137 MMOL/L (ref 136–145)
WBC # BLD: 9.9 K/UL (ref 4–11)

## 2020-07-31 PROCEDURE — 94760 N-INVAS EAR/PLS OXIMETRY 1: CPT

## 2020-07-31 PROCEDURE — 6370000000 HC RX 637 (ALT 250 FOR IP): Performed by: HOSPITALIST

## 2020-07-31 PROCEDURE — C9113 INJ PANTOPRAZOLE SODIUM, VIA: HCPCS | Performed by: HOSPITALIST

## 2020-07-31 PROCEDURE — 85027 COMPLETE CBC AUTOMATED: CPT

## 2020-07-31 PROCEDURE — 36415 COLL VENOUS BLD VENIPUNCTURE: CPT

## 2020-07-31 PROCEDURE — 6360000002 HC RX W HCPCS: Performed by: HOSPITALIST

## 2020-07-31 PROCEDURE — 6370000000 HC RX 637 (ALT 250 FOR IP): Performed by: INTERNAL MEDICINE

## 2020-07-31 PROCEDURE — 6360000002 HC RX W HCPCS: Performed by: INTERNAL MEDICINE

## 2020-07-31 PROCEDURE — 80048 BASIC METABOLIC PNL TOTAL CA: CPT

## 2020-07-31 PROCEDURE — 2500000003 HC RX 250 WO HCPCS: Performed by: HOSPITALIST

## 2020-07-31 PROCEDURE — 1200000000 HC SEMI PRIVATE

## 2020-07-31 RX ADMIN — HALOPERIDOL LACTATE 1 MG: 5 INJECTION, SOLUTION INTRAMUSCULAR at 17:49

## 2020-07-31 RX ADMIN — Medication 100 MG: at 10:23

## 2020-07-31 RX ADMIN — DONEPEZIL HYDROCHLORIDE 5 MG: 5 TABLET, FILM COATED ORAL at 23:23

## 2020-07-31 RX ADMIN — METRONIDAZOLE 500 MG: 500 INJECTION, SOLUTION INTRAVENOUS at 10:37

## 2020-07-31 RX ADMIN — CIPROFLOXACIN 400 MG: 2 INJECTION, SOLUTION INTRAVENOUS at 23:57

## 2020-07-31 RX ADMIN — CIPROFLOXACIN 400 MG: 2 INJECTION, SOLUTION INTRAVENOUS at 11:47

## 2020-07-31 RX ADMIN — FOLIC ACID 1 MG: 1 TABLET ORAL at 10:23

## 2020-07-31 RX ADMIN — PANTOPRAZOLE SODIUM 40 MG: 40 INJECTION, POWDER, FOR SOLUTION INTRAVENOUS at 10:23

## 2020-07-31 RX ADMIN — CYANOCOBALAMIN TAB 1000 MCG 1000 MCG: 1000 TAB at 10:23

## 2020-07-31 RX ADMIN — ENOXAPARIN SODIUM 40 MG: 40 INJECTION SUBCUTANEOUS at 10:25

## 2020-07-31 RX ADMIN — METRONIDAZOLE 500 MG: 500 INJECTION, SOLUTION INTRAVENOUS at 01:39

## 2020-07-31 ASSESSMENT — PAIN SCALES - PAIN ASSESSMENT IN ADVANCED DEMENTIA (PAINAD)
CONSOLABILITY: 0
TOTALSCORE: 0
TOTALSCORE: 0
BODYLANGUAGE: 0
BREATHING: 0
TOTALSCORE: 0
BREATHING: 0
CONSOLABILITY: 0
CONSOLABILITY: 0
TOTALSCORE: 0
TOTALSCORE: 0
BODYLANGUAGE: 0
BREATHING: 0
NEGVOCALIZATION: 0
FACIALEXPRESSION: 0
NEGVOCALIZATION: 0
BODYLANGUAGE: 0
TOTALSCORE: 0
NEGVOCALIZATION: 0
TOTALSCORE: 0
FACIALEXPRESSION: 0
FACIALEXPRESSION: 0
CONSOLABILITY: 0
BREATHING: 0
CONSOLABILITY: 0
CONSOLABILITY: 0
NEGVOCALIZATION: 0
BREATHING: 0
FACIALEXPRESSION: 0
NEGVOCALIZATION: 0
BODYLANGUAGE: 0
CONSOLABILITY: 0
BODYLANGUAGE: 0
TOTALSCORE: 0
NEGVOCALIZATION: 0
BODYLANGUAGE: 0
BODYLANGUAGE: 0
NEGVOCALIZATION: 0
FACIALEXPRESSION: 0
BODYLANGUAGE: 0
NEGVOCALIZATION: 0
FACIALEXPRESSION: 0
NEGVOCALIZATION: 0
BODYLANGUAGE: 0
BREATHING: 0
NEGVOCALIZATION: 0
FACIALEXPRESSION: 0
BODYLANGUAGE: 0
NEGVOCALIZATION: 0
FACIALEXPRESSION: 0
BREATHING: 0
TOTALSCORE: 0
BODYLANGUAGE: 0
CONSOLABILITY: 0
CONSOLABILITY: 0
BREATHING: 0
BREATHING: 0

## 2020-07-31 ASSESSMENT — PAIN SCALES - WONG BAKER
WONGBAKER_NUMERICALRESPONSE: 0

## 2020-07-31 ASSESSMENT — PAIN SCALES - GENERAL
PAINLEVEL_OUTOF10: 0

## 2020-07-31 NOTE — CARE COORDINATION
Ketan spoke to Clemente at [facility] [phone] and she is aware of DME order for cane.      Gretchen Basilio RN, BSN  [phone]

## 2020-07-31 NOTE — PROGRESS NOTES
Pt refused vitals. Pt remains verbally aggressive.  Electronically signed by Venecia Lawson RN on 7/31/2020 at 12:02 AM

## 2020-07-31 NOTE — PROGRESS NOTES
Pt woke up and agreed to vital signs. Pt assisted with ambulation to bathroom. Pt also agreed to allow lab to draw blood. Pt reports feeling more rested this morning and feeling better. All questions answered regarding plan of care. Will continue to monitor.

## 2020-07-31 NOTE — CARE COORDINATION
Cm went to room and noted that pt's cane was delivered. Pt does not yet have a discharge order and may d/c later today. CM faxed home care orders and CM note from 7/29 of demographics and phone number where pt will be staying on discharge. If pt discharges later today or over weekend will need SHYANN/AVS faxed to [facility] at [phone].      David Coleman RN, BSN  [phone]

## 2020-07-31 NOTE — PROGRESS NOTES
Pt agitated. Swearing at staff and becoming physically and verbally aggressive. Ativan given. Pt to RR with CGA. Ativan just given. Pt back in bed. Yasmine care provided. New socks, diaper, and bed pad in place. Will continue to monitor.  Electronically signed by Isaura Franco RN on 7/30/2020 at 11:00 PM

## 2020-07-31 NOTE — PROGRESS NOTES
Pt continues to refuse vital signs and assessments. Pt states she wants to sleep. Pt is less agitated/resting, but during attempts to obtain vitals, pt does become physically and verbally aggressive. Pt is now resting with eyes closed. Will continue to monitor.

## 2020-07-31 NOTE — PROGRESS NOTES
Hospitalist Progress Note      PCP: Dain Guadalupe MD    Date of Admission: 7/26/2020    Chief Complaint: abd pain      Subjective:   Mental status seems improved today  Denies acute complaints at this time but fairly poor historian  Continued diarrhea    Medications:  Reviewed    Infusion Medications   Scheduled Medications    polyethylene glycol  17 g Oral Daily    cyanocobalamin  1,000 mcg Oral Daily    donepezil  5 mg Oral Nightly    folic acid  1 mg Oral Daily    vitamin B-1  100 mg Oral Daily    amLODIPine  5 mg Oral Daily    pantoprazole  40 mg Intravenous Daily    nicotine  1 patch Transdermal Q24H    enoxaparin  40 mg Subcutaneous Daily    metroNIDAZOLE  500 mg Intravenous Q8H    ciprofloxacin  400 mg Intravenous Q12H     PRN Meds: LORazepam, haloperidol lactate, sodium chloride flush, potassium chloride **OR** potassium alternative oral replacement **OR** potassium chloride, magnesium sulfate, acetaminophen **OR** acetaminophen, ipratropium-albuterol, ondansetron **OR** ondansetron      Intake/Output Summary (Last 24 hours) at 7/31/2020 1436  Last data filed at 7/31/2020 1147  Gross per 24 hour   Intake 720 ml   Output --   Net 720 ml       Exam:    BP 96/61   Pulse 75   Temp 98.1 °F (36.7 °C) (Oral)   Resp 16   Ht 5' 6\" (1.676 m)   Wt 124 lb 9 oz (56.5 kg)   SpO2 96%   BMI 20.10 kg/m²     Gen/overall appearance: Not in acute distress. Alert. Poor historian. Head: Normocephalic, atraumatic  Eyes: EOMI, no scleral icterus  CVS: regular rate and rhythm, Normal S1S2  Pulm: Clear to auscultation bilaterally. No crackles/wheezes  Gastrointestinal: Soft, nontender, nondistended, no guarding or rebound  Extremities: No edema. No erythema or warmth  Neuro: No gross focal deficits noted.   Confused  Skin: Warm, dry    Labs:   Recent Labs     07/29/20  0510 07/30/20  0512 07/31/20  0516   WBC 6.7 9.2 9.9   HGB 12.4 11.2* 12.8   HCT 37.9 33.9* 36.3    308 277     Recent Labs 07/29/20  0510 07/30/20  0512 07/31/20  0516    138 137   K 3.2* 3.9 4.4   * 107 105   CO2 20* 20* 19*   BUN 4* 4* 3*   CREATININE 0.6 0.7 0.6   CALCIUM 8.6 8.4 8.7     No results for input(s): AST, ALT, BILIDIR, BILITOT, ALKPHOS in the last 72 hours. No results for input(s): INR in the last 72 hours. No results for input(s): Erin Spice in the last 72 hours. Assessment/Plan:    Active Hospital Problems    Diagnosis Date Noted    Acute colitis [K52.9] 07/26/2020    Hypokalemia [E87.6] 07/26/2020    Acute diarrhea [R19.7] 07/26/2020    Dementia with behavioral disturbance ([facility]) [F03.91] 07/26/2020    Pressure injury of skin of right buttock [L89.319] 07/26/2020    History of alcohol abuse [F10.11] 07/26/2020     Acute colitis with stool impaction suggesting stercoral colitis  C/w Cipro and Flagyl  Diet advanced  Gentle IVF hydration  GI following     History of alcohol abuse. Was at [facility] protocol  PRN Ativan  Monitor for withdrawal     History of dementia with behavioral disturbance     COPD with tobacco abuse; compensated at this time     History of breast cancer stage II invasive lobular carcinoma of left breast  On Arimidex     Debility  PT OT following    R decubitus ulcer R buttocks  Wound care following    Diet: DIET DYSPHAGIA SOFT AND BITE-SIZED;   Code Status: Full Code    Dispo - Inpt, SNF? - declines    Miguelina Mcgowan MD

## 2020-08-01 VITALS
HEART RATE: 87 BPM | OXYGEN SATURATION: 97 % | SYSTOLIC BLOOD PRESSURE: 93 MMHG | WEIGHT: 116.19 LBS | DIASTOLIC BLOOD PRESSURE: 59 MMHG | RESPIRATION RATE: 16 BRPM | HEIGHT: 66 IN | BODY MASS INDEX: 18.67 KG/M2 | TEMPERATURE: 98.3 F

## 2020-08-01 LAB
ANION GAP SERPL CALCULATED.3IONS-SCNC: 11 MMOL/L (ref 3–16)
BUN BLDV-MCNC: <2 MG/DL (ref 7–20)
CALCIUM SERPL-MCNC: 9 MG/DL (ref 8.3–10.6)
CHLORIDE BLD-SCNC: 105 MMOL/L (ref 99–110)
CO2: 21 MMOL/L (ref 21–32)
CREAT SERPL-MCNC: 0.5 MG/DL (ref 0.6–1.2)
GFR AFRICAN AMERICAN: >60
GFR NON-AFRICAN AMERICAN: >60
GLUCOSE BLD-MCNC: 90 MG/DL (ref 70–99)
HCT VFR BLD CALC: 38.3 % (ref 36–48)
HEMOGLOBIN: 12.6 G/DL (ref 12–16)
MCH RBC QN AUTO: 31.4 PG (ref 26–34)
MCHC RBC AUTO-ENTMCNC: 32.9 G/DL (ref 31–36)
MCV RBC AUTO: 95.6 FL (ref 80–100)
PDW BLD-RTO: 13.1 % (ref 12.4–15.4)
PLATELET # BLD: 306 K/UL (ref 135–450)
PMV BLD AUTO: 8.1 FL (ref 5–10.5)
POTASSIUM REFLEX MAGNESIUM: 3.9 MMOL/L (ref 3.5–5.1)
RBC # BLD: 4 M/UL (ref 4–5.2)
SODIUM BLD-SCNC: 137 MMOL/L (ref 136–145)
WBC # BLD: 8.3 K/UL (ref 4–11)

## 2020-08-01 PROCEDURE — 80048 BASIC METABOLIC PNL TOTAL CA: CPT

## 2020-08-01 PROCEDURE — 6360000002 HC RX W HCPCS: Performed by: INTERNAL MEDICINE

## 2020-08-01 PROCEDURE — 6370000000 HC RX 637 (ALT 250 FOR IP): Performed by: HOSPITALIST

## 2020-08-01 PROCEDURE — 6360000002 HC RX W HCPCS: Performed by: HOSPITALIST

## 2020-08-01 PROCEDURE — 85027 COMPLETE CBC AUTOMATED: CPT

## 2020-08-01 PROCEDURE — 36415 COLL VENOUS BLD VENIPUNCTURE: CPT

## 2020-08-01 PROCEDURE — 2580000003 HC RX 258: Performed by: HOSPITALIST

## 2020-08-01 PROCEDURE — 2500000003 HC RX 250 WO HCPCS: Performed by: HOSPITALIST

## 2020-08-01 PROCEDURE — C9113 INJ PANTOPRAZOLE SODIUM, VIA: HCPCS | Performed by: HOSPITALIST

## 2020-08-01 RX ORDER — CIPROFLOXACIN 500 MG/1
500 TABLET, FILM COATED ORAL 2 TIMES DAILY
Qty: 20 TABLET | Refills: 0 | Status: SHIPPED | OUTPATIENT
Start: 2020-08-01 | End: 2020-08-11

## 2020-08-01 RX ORDER — METRONIDAZOLE 250 MG/1
500 TABLET ORAL EVERY 8 HOURS SCHEDULED
Status: DISCONTINUED | OUTPATIENT
Start: 2020-08-01 | End: 2020-08-01 | Stop reason: HOSPADM

## 2020-08-01 RX ORDER — CIPROFLOXACIN 500 MG/1
500 TABLET, FILM COATED ORAL EVERY 12 HOURS SCHEDULED
Status: DISCONTINUED | OUTPATIENT
Start: 2020-08-01 | End: 2020-08-01 | Stop reason: HOSPADM

## 2020-08-01 RX ORDER — METRONIDAZOLE 500 MG/1
500 TABLET ORAL 3 TIMES DAILY
Qty: 30 TABLET | Refills: 0 | Status: SHIPPED | OUTPATIENT
Start: 2020-08-01 | End: 2020-08-11

## 2020-08-01 RX ADMIN — HALOPERIDOL LACTATE 1 MG: 5 INJECTION, SOLUTION INTRAMUSCULAR at 01:35

## 2020-08-01 RX ADMIN — PANTOPRAZOLE SODIUM 40 MG: 40 INJECTION, POWDER, FOR SOLUTION INTRAVENOUS at 10:49

## 2020-08-01 RX ADMIN — Medication 100 MG: at 10:44

## 2020-08-01 RX ADMIN — CYANOCOBALAMIN TAB 1000 MCG 1000 MCG: 1000 TAB at 10:44

## 2020-08-01 RX ADMIN — FOLIC ACID 1 MG: 1 TABLET ORAL at 10:44

## 2020-08-01 RX ADMIN — ACETAMINOPHEN 650 MG: 325 TABLET, FILM COATED ORAL at 00:05

## 2020-08-01 RX ADMIN — CIPROFLOXACIN 500 MG: 500 TABLET, FILM COATED ORAL at 10:44

## 2020-08-01 RX ADMIN — METRONIDAZOLE 500 MG: 500 INJECTION, SOLUTION INTRAVENOUS at 03:19

## 2020-08-01 RX ADMIN — METRONIDAZOLE 500 MG: 250 TABLET ORAL at 13:41

## 2020-08-01 RX ADMIN — Medication 10 ML: at 10:44

## 2020-08-01 ASSESSMENT — PAIN SCALES - PAIN ASSESSMENT IN ADVANCED DEMENTIA (PAINAD)
BODYLANGUAGE: 0
BODYLANGUAGE: 0
TOTALSCORE: 0
CONSOLABILITY: 0
NEGVOCALIZATION: 0
NEGVOCALIZATION: 0
BREATHING: 0
TOTALSCORE: 0
BODYLANGUAGE: 0
BODYLANGUAGE: 0
CONSOLABILITY: 0
TOTALSCORE: 0
BREATHING: 0
CONSOLABILITY: 0
NEGVOCALIZATION: 0
TOTALSCORE: 0
FACIALEXPRESSION: 0
FACIALEXPRESSION: 0
BREATHING: 0
BODYLANGUAGE: 0
BREATHING: 0
TOTALSCORE: 0
BODYLANGUAGE: 0
CONSOLABILITY: 0
FACIALEXPRESSION: 0
BODYLANGUAGE: 0
TOTALSCORE: 0
NEGVOCALIZATION: 0
BREATHING: 0
TOTALSCORE: 0
NEGVOCALIZATION: 0
FACIALEXPRESSION: 0
CONSOLABILITY: 0
NEGVOCALIZATION: 0
CONSOLABILITY: 0
BREATHING: 0
BREATHING: 0
TOTALSCORE: 0
CONSOLABILITY: 0
CONSOLABILITY: 0
FACIALEXPRESSION: 0
BREATHING: 0
BODYLANGUAGE: 0
FACIALEXPRESSION: 0
NEGVOCALIZATION: 0
FACIALEXPRESSION: 0
FACIALEXPRESSION: 0
NEGVOCALIZATION: 0

## 2020-08-01 ASSESSMENT — PAIN SCALES - WONG BAKER
WONGBAKER_NUMERICALRESPONSE: 0

## 2020-08-01 ASSESSMENT — PAIN SCALES - GENERAL
PAINLEVEL_OUTOF10: 0
PAINLEVEL_OUTOF10: 7
PAINLEVEL_OUTOF10: 0

## 2020-08-01 NOTE — PROGRESS NOTES
Data- discharge order received, pt verbalized agreement to discharge, needs for [address] [facility] with [facility] and/or new Durable Medical Equipment through [facility] for [facility], SHYANN reviewed and signed by MD, to be completed by RN. Action- AVS prepared, discharge instructions prepared and given to daughter, medication information packet given r/t NEW or CHANGED prescriptions, pt verbalized understanding further self-review. D/C instruction summary: Diet- soft and bite size, Activity- as wilbert, follow up with Primary Care Physician Whit Phelps -[phone] appointment, immunizations reviewed, medications prescriptions to be filled pt pharmacy. Response- Case Management/ reported faxing completed SHYANN and AVS to needed HHC/DME services stated above. Pt belongings gathered, IV removed, pt dressed . Disposition is home with HHC/DME as stated above, transported with daughter, taken to lobby via w/c with staff, no complications.

## 2020-08-03 ENCOUNTER — HOSPITAL ENCOUNTER (EMERGENCY)
Age: 70
Discharge: HOME | End: 2020-08-03
Payer: MEDICARE

## 2020-08-03 VITALS
WEIGHT: 112.66 LBS | TEMPERATURE: 97.8 F | RESPIRATION RATE: 16 BRPM | BODY MASS INDEX: 17.68 KG/M2 | SYSTOLIC BLOOD PRESSURE: 113 MMHG | OXYGEN SATURATION: 98 % | HEART RATE: 80 BPM | HEIGHT: 67 IN | DIASTOLIC BLOOD PRESSURE: 71 MMHG

## 2020-08-03 PROCEDURE — 6360000002 HC RX W HCPCS: Performed by: NURSE PRACTITIONER

## 2020-08-03 PROCEDURE — 2500000003 HC RX 250 WO HCPCS: Performed by: NURSE PRACTITIONER

## 2020-08-03 PROCEDURE — 90471 IMMUNIZATION ADMIN: CPT | Performed by: NURSE PRACTITIONER

## 2020-08-03 PROCEDURE — 12004 RPR S/N/AX/GEN/TRK7.6-12.5CM: CPT

## 2020-08-03 PROCEDURE — 90715 TDAP VACCINE 7 YRS/> IM: CPT | Performed by: NURSE PRACTITIONER

## 2020-08-03 PROCEDURE — 99282 EMERGENCY DEPT VISIT SF MDM: CPT

## 2020-08-03 RX ORDER — BUPIVACAINE HYDROCHLORIDE AND EPINEPHRINE 5; 5 MG/ML; UG/ML
10 INJECTION, SOLUTION EPIDURAL; INTRACAUDAL; PERINEURAL ONCE
Status: COMPLETED | OUTPATIENT
Start: 2020-08-03 | End: 2020-08-03

## 2020-08-03 RX ADMIN — BUPIVACAINE HYDROCHLORIDE AND EPINEPHRINE BITARTRATE 10 ML: 5; .005 INJECTION, SOLUTION EPIDURAL; INTRACAUDAL; PERINEURAL at 20:24

## 2020-08-03 RX ADMIN — TETANUS TOXOID, REDUCED DIPHTHERIA TOXOID AND ACELLULAR PERTUSSIS VACCINE, ADSORBED 0.5 ML: 5; 2.5; 8; 8; 2.5 SUSPENSION INTRAMUSCULAR at 20:23

## 2020-08-03 ASSESSMENT — PAIN SCALES - GENERAL
PAINLEVEL_OUTOF10: 9
PAINLEVEL_OUTOF10: 9
PAINLEVEL_OUTOF10: 0
PAINLEVEL_OUTOF10: 2
PAINLEVEL_OUTOF10: 3

## 2020-08-03 ASSESSMENT — PAIN DESCRIPTION - LOCATION
LOCATION: HAND

## 2020-08-03 ASSESSMENT — PAIN DESCRIPTION - DESCRIPTORS
DESCRIPTORS: ACHING
DESCRIPTORS: ACHING

## 2020-08-03 ASSESSMENT — PAIN DESCRIPTION - ORIENTATION
ORIENTATION: RIGHT

## 2020-08-03 ASSESSMENT — PAIN DESCRIPTION - PAIN TYPE
TYPE: ACUTE PAIN

## 2020-08-04 NOTE — ED PROVIDER NOTES
**EVALUATED BY ADVANCED PRACTICE PROVIDER**        [address]      Pt Name: Clara Farias  GMP:[id number]  [place] 1950  Date of evaluation: 8/3/2020  Provider: Verner Austria, APRN - CNP      Chief Complaint:    Chief Complaint   Patient presents with    Laceration     right hand ACROSS PALM AND FINGERS       Nursing Notes, Past Medical Hx, Past Surgical Hx, Social Hx, Allergies, and Family Hx were all reviewed and agreed with or any disagreements were addressed in the HPI.    HPI:  (Location, Duration, Timing, Severity, Quality, Assoc Sx, Context, Modifying factors)  This is a  81 y.o. female who presents to the emergency department today complaining of a laceration to her right hand. Onset was just prior to arrival.  The context was she was holding a knife wrong. Daughter at bedside advised that she has dementia. HPI is limited due to patient's dementia. She advised she does not even know how the laceration happened. She will participate in exam by following simple instructions. Tetanus status unknown.     PastMedical/Surgical History:      Diagnosis Date    Cancer ([facility] [address]     cancer    Hypertension     Pneumonia     Sickle cell trait ([facility] [address]          Procedure Laterality Date    HEMORRHOID SURGERY      TUBAL LIGATION         Medications:  Discharge Medication List as of 8/3/2020 10:37 PM      CONTINUE these medications which have NOT CHANGED    Details   ciprofloxacin (CIPRO) 500 MG tablet Take 1 tablet by mouth 2 times daily for 10 days, Disp-20 tablet,R-0Normal      metroNIDAZOLE (FLAGYL) 500 MG tablet Take 1 tablet by mouth 3 times daily for 10 days, Disp-30 tablet,R-0Normal      amLODIPine (NORVASC) 5 MG tablet Take 5 mg by mouth dailyHistorical Med      anastrozole (ARIMIDEX) 1 MG tablet Take 1 mg by mouth dailyHistorical Med      calcium-vitamin D (OSCAL-500) 500-200 MG-UNIT per tablet Take 1 tablet by mouth 2 times dailyHistorical Med      cyanocobalamin 1000 MCG tablet Take 1,000 mcg by mouth dailyHistorical Med      donepezil (ARICEPT) 5 MG tablet Take 5 mg by mouth nightlyHistorical Med      escitalopram (LEXAPRO) 10 MG tablet Take 10 mg by mouth dailyHistorical Med      folic acid (FOLVITE) 1 MG tablet Take 1 mg by mouth dailyHistorical Med      vitamin B-1 (THIAMINE) 100 MG tablet Take 100 mg by mouth dailyHistorical Med      hydrochlorothiazide (HYDRODIURIL) 25 MG tablet Take 25 mg by mouth daily      HYDROcodone-acetaminophen (NORCO) 5-325 MG per tablet Take 1 tablet by mouth every 6 hours as needed for Pain (Takes it once or twice a day)               Review of Systems:  Review of Systems   Unable to perform ROS: Dementia   Skin: Positive for wound. Positives and Pertinent negatives as per HPI. Except as noted above in the ROS, problem specific ROS was completed and is negative. Physical Exam:  Physical Exam  Vitals signs and nursing note reviewed. Constitutional:       General: She is not in acute distress. Appearance: Normal appearance. She is well-developed. She is not toxic-appearing. HENT:      Head: Normocephalic and atraumatic. Eyes:      General: No scleral icterus. Conjunctiva/sclera: Conjunctivae normal.   Neck:      Musculoskeletal: Normal range of motion. Vascular: No JVD. Cardiovascular:      Rate and Rhythm: Normal rate and regular rhythm. Pulses:           Radial pulses are 2+ on the right side. Pulmonary:      Effort: Pulmonary effort is normal. No respiratory distress. Abdominal:      Palpations: Abdomen is not rigid. Musculoskeletal: Normal range of motion. Right hand: She exhibits tenderness, laceration and swelling. She exhibits normal range of motion, normal capillary refill and no deformity. Normal sensation noted. Comments: Several lacerations to the palmar aspect of the right hand. Distal extremity is pink and well-perfused.   Capillary refill <2 seconds. Sensation is intact. Extensor tendons all seem to be intact. No arterial bleeding. No bone tendon or foreign body exposed. X-ray shows no fracture or foreign body. Horizontal mattress sutures were placed with good approximation of wound edges. Antibiotics were not prescribed because she is already taking Cipro and Flagyl for GI. Skin:     General: Skin is warm and dry. Capillary Refill: Capillary refill takes less than 2 seconds. Neurological:      General: No focal deficit present. Mental Status: She is alert. Psychiatric:         Mood and Affect: Mood normal.             MEDICAL DECISION MAKING    Vitals:    Vitals:    08/03/20 1954   BP: 113/71   Pulse: 80   Resp: 16   Temp: 97.8 °F (36.6 °C)   TempSrc: Temporal   SpO2: 98%   Weight: 112 lb 10.5 oz (51.1 kg)   Height: 5' 7\" (1.702 m)       LABS:Labs Reviewed - No data to display     Remainder of labs reviewed and werenegative at this time or not returned at the time of this note. RADIOLOGY:   Non-plain film images such as CT, Ultrasound and MRI are read by the radiologist. RUI Valle CNP have directly visualized the radiologic plain film image(s) with the below findings:        Interpretation per the Radiologist below, if available at the time of this note:    No orders to display        Ct Abdomen Pelvis W Iv Contrast Additional Contrast? None    Result Date: 7/27/2020  EXAMINATION: CT OF THE ABDOMEN AND PELVIS WITH CONTRAST 7/26/2020 2:59 pm TECHNIQUE: CT of the abdomen and pelvis was performed with the administration of intravenous contrast. Multiplanar reformatted images are provided for review. Dose modulation, iterative reconstruction, and/or weight based adjustment of the mA/kV was utilized to reduce the radiation dose to as low as reasonably achievable. COMPARISON: None.  HISTORY: ORDERING SYSTEM PROVIDED HISTORY: diarrhea, dementia, periwound TECHNOLOGIST PROVIDED HISTORY: Reason for exam:->diarrhea, dementia, periwound Additional Contrast?->None Reason for Exam: diarrhea, dementia, periwound Acuity: Unknown Type of Exam: Unknown FINDINGS: Lower Chest: No acute infiltrate at the lung bases. Diffuse emphysematous changes are apparent. Organs: The liver, spleen, pancreas and adrenal glands are unremarkable. No acute biliary findings. No renal mass or significant hydronephrosis. GI/Bowel: The stomach and duodenal sweep are unremarkable. No significant small bowel distension. There is wall thickening perienteric inflammatory changes in the distal sigmoid colon and rectum with mild retained stool. Findings are concerning for stercoral colitis. No other pericolonic inflammatory changes. Pelvis: Degenerated calcified uterine fibroids. No other pelvic mass or free pelvic fluid. There is moderate distention of the urinary bladder with the dome of the bladder reaching the iliac crests. No focal bladder wall thickening. Peritoneum/Retroperitoneum: The abdominal aorta is normal in caliber with mild calcified atherosclerotic plaque. No retroperitoneal adenopathy or upper abdominal ascites. Bones/Soft Tissues: No acute osseous or soft tissue abnormality. 1. Colonic wall thickening with perienteric inflammatory changes in the distal sigmoid colon and rectum concerning for stercoral colitis. Mild retained stool. 2. No other acute findings within the abdomen or pelvis. 3. Degenerated calcified uterine fibroids. 4. Moderate distention of the urinary bladder. Xr Chest Portable    Result Date: 7/26/2020  EXAMINATION: ONE XRAY VIEW OF THE CHEST 7/26/2020 8:15 pm COMPARISON: None. HISTORY: ORDERING SYSTEM PROVIDED HISTORY: COPD, short of breath TECHNOLOGIST PROVIDED HISTORY: Reason for exam:-> COPD Reason for Exam: COPD. SMOKER. HISTORY OF SICKLE CELL TRAIT, CANCER, HTN Acuity: Acute Type of Exam: Initial FINDINGS: The cardiomediastinal and hilar silhouettes appear unremarkable.   Calcific atherosclerotic disease aorta.   Chronic appearing coarse interstitial densities predominate perihilar regions and lung bases, typical of sequela from smoking or other previous infectious/inflammatory process. The lungs are hyperinflated with increased translucency both lung apices and tapering of the vasculature in the upper lungs. Hazy interstitial and alveolar density are demonstrated medial and central left lower lung. The right lung appears clear. No pleural effusion evident. No pneumothorax is seen. No acute osseous abnormality is identified. Possible acute infiltrate such as pneumonia medial and central lower left lung. Pulmonary sequela typical of that seen with smoking, including COPD. Calcific atherosclerotic disease aorta. MEDICAL DECISION MAKING / ED COURSE:      PROCEDURES:   Lac Repair    Date/Time: 8/4/2020 2:26 AM  Performed by: RUI Naik CNP  Authorized by: RUI Naik CNP     Consent:     Consent obtained:  Verbal    Risks discussed:  Infection, need for additional repair, nerve damage, poor wound healing, poor cosmetic result, pain, retained foreign body, tendon damage and vascular damage  Anesthesia (see MAR for exact dosages): Anesthesia method:  Local infiltration    Local anesthetic:  Bupivacaine 0.5% WITH epi  Laceration details:     Location:  Hand    Hand location:  R palm    Length (cm):  6  Repair type:     Repair type:  Simple  Pre-procedure details:     Preparation:  Patient was prepped and draped in usual sterile fashion and imaging obtained to evaluate for foreign bodies  Exploration:     Hemostasis achieved with:  Epinephrine and direct pressure    Wound exploration: wound explored through full range of motion and entire depth of wound probed and visualized      Contaminated: no    Treatment:     Wound cleansed with: Chlorhexidine.     Amount of cleaning:  Extensive  Skin repair:     Repair method:  Sutures    Suture size:  5-0    Suture material:  Nylon Suture technique:  Horizontal mattress    Number of sutures:  10  Approximation:     Approximation:  Close  Post-procedure details:     Dressing:  Sterile dressing    Patient tolerance of procedure: Tolerated well, no immediate complications  Lac Repair    Date/Time: 8/4/2020 2:27 AM  Performed by: RUI Mera CNP  Authorized by: RUI Mera CNP     Consent:     Consent obtained:  Verbal    Consent given by:  Patient    Risks discussed:  Infection, need for additional repair, nerve damage, poor cosmetic result, pain, poor wound healing, vascular damage, tendon damage and retained foreign body  Anesthesia (see MAR for exact dosages): Anesthesia method:  Local infiltration    Local anesthetic:  Bupivacaine 0.5% WITH epi  Laceration details:     Location:  Finger    Finger location:  R ring finger    Length (cm):  3  Repair type:     Repair type:  Simple  Pre-procedure details:     Preparation:  Patient was prepped and draped in usual sterile fashion and imaging obtained to evaluate for foreign bodies  Exploration:     Hemostasis achieved with:  Epinephrine and direct pressure    Wound exploration: wound explored through full range of motion and entire depth of wound probed and visualized      Contaminated: no    Treatment:     Wound cleansed with: Chlorhexidine. Amount of cleaning:  Extensive  Skin repair:     Repair method:  Sutures    Suture size:  5-0    Suture material:  Nylon    Suture technique:  Horizontal mattress    Number of sutures:  6  Approximation:     Approximation:  Close  Post-procedure details:     Dressing:  Sterile dressing    Patient tolerance of procedure:   Tolerated well, no immediate complications  Lac Repair    Date/Time: 8/4/2020 2:28 AM  Performed by: RUI Mera CNP  Authorized by: RUI Mera CNP     Consent:     Consent obtained:  Verbal    Consent given by:  Patient    Risks discussed:  Need for additional repair, nerve damage, infection, pain, poor cosmetic result, poor wound healing, vascular damage, tendon damage and retained foreign body  Anesthesia (see MAR for exact dosages): Anesthesia method:  Local infiltration    Local anesthetic:  Bupivacaine 0.5% WITH epi  Laceration details:     Location:  Finger    Finger location:  R long finger    Length (cm):  3  Repair type:     Repair type:  Simple  Pre-procedure details:     Preparation:  Patient was prepped and draped in usual sterile fashion and imaging obtained to evaluate for foreign bodies  Exploration:     Hemostasis achieved with:  Epinephrine and direct pressure    Wound exploration: wound explored through full range of motion and entire depth of wound probed and visualized      Contaminated: no    Treatment:     Wound cleansed with: Chlorhexidine. Amount of cleaning:  Extensive  Skin repair:     Repair method:  Sutures    Suture size:  5-0    Suture material:  Nylon    Suture technique:  Horizontal mattress    Number of sutures:  7  Approximation:     Approximation:  Close  Post-procedure details:     Dressing:  Sterile dressing    Patient tolerance of procedure: Tolerated well, no immediate complications          Patient was given:  Medications   Tetanus-Diphth-Acell Pertussis (BOOSTRIX) injection 0.5 mL (0.5 mLs Intramuscular Given 8/3/20 2023)   bupivacaine-EPINEPHrine PF (MARCAINE-w/EPINEPHRINE) 0.5% -[id number] injection 10 mL (10 mLs Intradermal Given by Other 8/3/20 2024)       Differential diagnosis: Tendon laceration, neurologic injury, vascular injury, involvement of bone that could lead to osteomyelitis, retained foreign body, delayed bacterial skin infection, other    Patient presents with right hand laceration. See HPI for full presentation. Physical exam as above. 81year-old lying in bed in no acute distress. Demented. Daughter who is power of  at bedside and very helpful. Several lacerations to the palmar aspect of the right hand.   Distal voice recognition program.  Efforts were made to edit the dictations but occasionally words are mis-transcribed.)    Electronically signed, RUI Yang CNP,           RUI Yang CNP  08/04/20 1097

## 2020-08-05 NOTE — DISCHARGE SUMMARY
at time of discharge stable     Medication instructions provided to patient at discharge. Medication List      START taking these medications    ciprofloxacin 500 MG tablet  Commonly known as:  CIPRO  Take 1 tablet by mouth 2 times daily for 10 days  Notes to patient:  Antibiotic used to treat infection  Side effects may include nausea/diarrhea/upset stomach     metroNIDAZOLE 500 MG tablet  Commonly known as:  FLAGYL  Take 1 tablet by mouth 3 times daily for 10 days  Notes to patient:  Antibiotic used to treat infection  Side effects may include nausea/diarrhea/upset stomach        CONTINUE taking these medications    amLODIPine 5 MG tablet  Commonly known as:  NORVASC     anastrozole 1 MG tablet  Commonly known as:  ARIMIDEX     calcium-vitamin D 500-200 MG-UNIT per tablet  Commonly known as:  OSCAL-500     cyanocobalamin 1000 MCG tablet     donepezil 5 MG tablet  Commonly known as:  ARICEPT     folic acid 1 MG tablet  Commonly known as:  FOLVITE     hydroCHLOROthiazide 25 MG tablet  Commonly known as:  HYDRODIURIL     HYDROcodone-acetaminophen 5-325 MG per tablet  Commonly known as:  NORCO     Lexapro 10 MG tablet  Generic drug:  escitalopram     vitamin B-1 100 MG tablet  Commonly known as:  THIAMINE        STOP taking these medications    ibuprofen 800 MG tablet  Commonly known as:  ADVIL;MOTRIN           Where to Get Your Medications      These medications were sent to [facility], [address] [place] [address]    Phone:  [phone]   · ciprofloxacin 500 MG tablet  · metroNIDAZOLE 500 MG tablet         Discharge recommendations given to patient. Follow Up. pcp in 1 week   Disposition. home  Activity. activity as tolerated  Diet: No diet orders on file      Spent 45  minutes in discharge process.     Signed:  Alesia Tineo MD     8/5/2020 4:06 PM

## 2020-08-10 NOTE — PROGRESS NOTES
Physician Progress Note      Merly Hidalgo  CSN #:                  [id number]  :                       1950  ADMIT DATE:       2020 11:07 AM  DISCH DATE:        2020 2:05 PM  RESPONDING  PROVIDER #:        Manish Falcon MD          QUERY TEXT:    This patient was admitted with acute colitis with stool impaction treated with  IV Cipro and Flagyl and discharged on both. After study, were you treating? The medical record reflects the following:  Risk Factors: 81 y.o. female who presented to the ED from [facility]   memory unit to be evaluated for 4-day history of diarrhea. The patient has a   history of dementia with behavioral disturbance, and she has been verbally   aggressive since arrival to the hospital.  Clinical Indicators: CT IMP Colonic wall thickening with perienteric   inflammatory changes in the distal sigmoid colon and rectum concerning for   stercoral colitis. Mild retained stool  Treatment: IV Cipro and Flagyl,    Thank you. Please contact me if you have any questions @ [email or web address],   Rico Hashimoto RN, CDS  Options provided:  -- ?Bacterial colitis  -- ? Bacterial colitis  not clinically significant  -- Infectious colitis NOS  -- Infectious colitis NOS is not clinically significant  -- Other - I will add my own diagnosis  -- Disagree - Not applicable / Not valid  -- Disagree - Clinically unable to determine / Unknown  -- Refer to Clinical Documentation Reviewer    PROVIDER RESPONSE TEXT:    This patient has? Bacterial colitis .     Query created by: Tricia Seen on 8/10/2020 8:15 AM      Electronically signed by:  Manish Falcon MD 8/10/2020 12:20 PM

## 2021-07-22 ENCOUNTER — APPOINTMENT (OUTPATIENT)
Dept: GENERAL RADIOLOGY | Age: 71
End: 2021-07-22
Payer: COMMERCIAL

## 2021-07-22 ENCOUNTER — HOSPITAL ENCOUNTER (EMERGENCY)
Age: 71
Discharge: HOME | End: 2021-07-22
Attending: EMERGENCY MEDICINE
Payer: COMMERCIAL

## 2021-07-22 VITALS
OXYGEN SATURATION: 96 % | TEMPERATURE: 98.5 F | HEIGHT: 66 IN | DIASTOLIC BLOOD PRESSURE: 81 MMHG | WEIGHT: 113.54 LBS | SYSTOLIC BLOOD PRESSURE: 120 MMHG | RESPIRATION RATE: 18 BRPM | BODY MASS INDEX: 18.25 KG/M2 | HEART RATE: 77 BPM
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
## 2021-07-22 PROCEDURE — 99284 EMERGENCY DEPT VISIT MOD MDM: CPT

## 2021-07-22 PROCEDURE — 73560 X-RAY EXAM OF KNEE 1 OR 2: CPT

## 2021-07-22 PROCEDURE — 73030 X-RAY EXAM OF SHOULDER: CPT

## 2021-07-22 PROCEDURE — 72100 X-RAY EXAM L-S SPINE 2/3 VWS: CPT

## 2021-07-22 PROCEDURE — 6370000000 HC RX 637 (ALT 250 FOR IP): Performed by: EMERGENCY MEDICINE

## 2021-07-22 RX ORDER — ACETAMINOPHEN 325 MG/1
650 TABLET ORAL ONCE
Status: COMPLETED | OUTPATIENT
Start: 2021-07-22 | End: 2021-07-22

## 2021-07-22 RX ADMIN — ACETAMINOPHEN 650 MG: 325 TABLET ORAL at 15:37

## 2021-07-22 ASSESSMENT — PAIN SCALES - PAIN ASSESSMENT IN ADVANCED DEMENTIA (PAINAD)
NEGVOCALIZATION: 0
BREATHING: 0
BODYLANGUAGE: 1
CONSOLABILITY: 1
TOTALSCORE: 4
FACIALEXPRESSION: 2

## 2021-07-22 ASSESSMENT — PAIN SCALES - GENERAL: PAINLEVEL_OUTOF10: 6
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
## 2021-07-27 ENCOUNTER — TELEPHONE (OUTPATIENT)
Dept: ORTHOPEDIC SURGERY | Age: 71
End: 2021-07-27